# Patient Record
Sex: MALE | Race: WHITE | NOT HISPANIC OR LATINO | Employment: FULL TIME | URBAN - NONMETROPOLITAN AREA
[De-identification: names, ages, dates, MRNs, and addresses within clinical notes are randomized per-mention and may not be internally consistent; named-entity substitution may affect disease eponyms.]

---

## 2023-06-07 PROBLEM — K21.9 GERD (GASTROESOPHAGEAL REFLUX DISEASE): Status: ACTIVE | Noted: 2023-06-07

## 2023-06-07 PROBLEM — L21.9 SEBORRHEIC DERMATITIS OF SCALP: Status: ACTIVE | Noted: 2023-06-07

## 2023-06-07 PROBLEM — F41.9 ANXIETY: Status: ACTIVE | Noted: 2023-06-07

## 2023-06-07 PROBLEM — R91.8 LUNG NODULES: Status: ACTIVE | Noted: 2023-06-07

## 2023-06-07 PROBLEM — N52.9 ERECTILE DYSFUNCTION: Status: ACTIVE | Noted: 2023-06-07

## 2023-06-07 RX ORDER — GABAPENTIN 300 MG/1
2 CAPSULE ORAL 3 TIMES DAILY
COMMUNITY
Start: 2022-08-31 | End: 2023-08-21 | Stop reason: SINTOL

## 2023-06-07 RX ORDER — KETOCONAZOLE 20 MG/ML
SHAMPOO, SUSPENSION TOPICAL EVERY OTHER DAY
COMMUNITY
Start: 2017-11-10 | End: 2023-06-14 | Stop reason: SDUPTHER

## 2023-06-07 RX ORDER — TIZANIDINE 4 MG/1
1 TABLET ORAL 3 TIMES DAILY
COMMUNITY
Start: 2021-12-22

## 2023-06-07 RX ORDER — MELOXICAM 15 MG/1
1 TABLET ORAL DAILY
COMMUNITY
Start: 2021-12-22 | End: 2023-09-13 | Stop reason: ALTCHOICE

## 2023-06-14 ENCOUNTER — OFFICE VISIT (OUTPATIENT)
Dept: PRIMARY CARE | Facility: CLINIC | Age: 63
End: 2023-06-14
Payer: COMMERCIAL

## 2023-06-14 VITALS
SYSTOLIC BLOOD PRESSURE: 120 MMHG | DIASTOLIC BLOOD PRESSURE: 60 MMHG | BODY MASS INDEX: 24.04 KG/M2 | HEART RATE: 97 BPM | HEIGHT: 66 IN | TEMPERATURE: 98.6 F | OXYGEN SATURATION: 90 % | WEIGHT: 149.6 LBS

## 2023-06-14 DIAGNOSIS — F17.219 CIGARETTE NICOTINE DEPENDENCE WITH NICOTINE-INDUCED DISORDER: ICD-10-CM

## 2023-06-14 DIAGNOSIS — L21.9 SEBORRHEIC DERMATITIS OF SCALP: ICD-10-CM

## 2023-06-14 DIAGNOSIS — G47.33 OBSTRUCTIVE SLEEP APNEA: Chronic | ICD-10-CM

## 2023-06-14 DIAGNOSIS — R06.00 DYSPNEA, UNSPECIFIED TYPE: Chronic | ICD-10-CM

## 2023-06-14 DIAGNOSIS — L40.9 PSORIASIS: Chronic | ICD-10-CM

## 2023-06-14 DIAGNOSIS — M54.17 LUMBOSACRAL RADICULITIS: Chronic | ICD-10-CM

## 2023-06-14 DIAGNOSIS — Z00.00 ANNUAL PHYSICAL EXAM: Primary | ICD-10-CM

## 2023-06-14 PROCEDURE — G0296 VISIT TO DETERM LDCT ELIG: HCPCS | Performed by: INTERNAL MEDICINE

## 2023-06-14 PROCEDURE — 93000 ELECTROCARDIOGRAM COMPLETE: CPT | Performed by: INTERNAL MEDICINE

## 2023-06-14 PROCEDURE — 90471 IMMUNIZATION ADMIN: CPT | Performed by: INTERNAL MEDICINE

## 2023-06-14 PROCEDURE — 99396 PREV VISIT EST AGE 40-64: CPT | Performed by: INTERNAL MEDICINE

## 2023-06-14 PROCEDURE — 99406 BEHAV CHNG SMOKING 3-10 MIN: CPT | Performed by: INTERNAL MEDICINE

## 2023-06-14 PROCEDURE — 99214 OFFICE O/P EST MOD 30 MIN: CPT | Performed by: INTERNAL MEDICINE

## 2023-06-14 PROCEDURE — 90750 HZV VACC RECOMBINANT IM: CPT | Performed by: INTERNAL MEDICINE

## 2023-06-14 RX ORDER — KETOCONAZOLE 20 MG/ML
SHAMPOO, SUSPENSION TOPICAL EVERY OTHER DAY
Qty: 230 ML | Refills: 2 | Status: SHIPPED | OUTPATIENT
Start: 2023-06-14 | End: 2023-11-21 | Stop reason: SDUPTHER

## 2023-06-14 ASSESSMENT — ENCOUNTER SYMPTOMS
SINUS PAIN: 0
FEVER: 0
LOSS OF SENSATION IN FEET: 1
ARTHRALGIAS: 0
ABDOMINAL PAIN: 0
PALPITATIONS: 0
DIFFICULTY URINATING: 0
BLOOD IN STOOL: 0
FATIGUE: 1
UNEXPECTED WEIGHT CHANGE: 0
COUGH: 0
HEADACHES: 0
SORE THROAT: 0
OCCASIONAL FEELINGS OF UNSTEADINESS: 1
DIZZINESS: 0
BRUISES/BLEEDS EASILY: 0
DIARRHEA: 0
WHEEZING: 0
DEPRESSION: 0

## 2023-06-14 ASSESSMENT — ANXIETY QUESTIONNAIRES
4. TROUBLE RELAXING: NOT AT ALL
5. BEING SO RESTLESS THAT IT IS HARD TO SIT STILL: NOT AT ALL
2. NOT BEING ABLE TO STOP OR CONTROL WORRYING: NOT AT ALL
6. BECOMING EASILY ANNOYED OR IRRITABLE: NOT AT ALL
3. WORRYING TOO MUCH ABOUT DIFFERENT THINGS: NOT AT ALL
IF YOU CHECKED OFF ANY PROBLEMS ON THIS QUESTIONNAIRE, HOW DIFFICULT HAVE THESE PROBLEMS MADE IT FOR YOU TO DO YOUR WORK, TAKE CARE OF THINGS AT HOME, OR GET ALONG WITH OTHER PEOPLE: NOT DIFFICULT AT ALL
7. FEELING AFRAID AS IF SOMETHING AWFUL MIGHT HAPPEN: NOT AT ALL
GAD7 TOTAL SCORE: 0
1. FEELING NERVOUS, ANXIOUS, OR ON EDGE: NOT AT ALL

## 2023-06-14 ASSESSMENT — PATIENT HEALTH QUESTIONNAIRE - PHQ9
2. FEELING DOWN, DEPRESSED OR HOPELESS: NOT AT ALL
SUM OF ALL RESPONSES TO PHQ9 QUESTIONS 1 AND 2: 0
1. LITTLE INTEREST OR PLEASURE IN DOING THINGS: NOT AT ALL

## 2023-06-14 NOTE — PROGRESS NOTES
"Subjective   Patient ID: Lamberto Martinez is a 63 y.o. male who presents for Annual Exam (Would like a different cream ).    Annual preventive visit  - Vaccinations reviewed needs Shingrix vaccine given first dose today follow-up 2 months for booster dose  - Screening colonoscopy up-to-date 2019 need to repeat in 2024  -Needs a complete blood work  -Depression screening negative  -Nicotine dependency  \"I spent more 10 minutes counseling patient about need for smoking/tobacco cessation and how I can support efforts when patient is ready to quit.  Discussed nicotine replacement therapy, Varenicline, Bupropion, hypnosis, support groups, and accupunture as potential options.  Patient currently has no signs or symptoms of tobacco related disease.\".  -Screening for lung cancer  I discussed smoking history, patient meets criteria for lung cancer screening with low-dose CT scan. By using shared decision making we determined that patient would benefit from that screening including a discussion of benefits and harms of screening, follow-up diagnostic testing, over diagnosis, false positive  rate and total radiation exposure  I counseled the patient about the  importance of adhering to annual lung cancer low-dose CT screening, the impact of comorbidities and his  ability or willingness to undergo diagnosis and treatment if abnormality discovered. I provided patient an order for low-dose CT lung cancer screening.      Follow-up  -History of traumatic nose injury with chronic blockage patient is underlying sleep apnea did not tolerate CPAP complaining of fatigue and tiredness  Refer patient to sleep clinic for further eval recommendation need to repeat sleep studies  - Shortness of breath  EKG sinus rhythm proceed with CT scan as recommended  - Psoriasis continue with current local creams steroid cream betamethasone  -Psoriasis  - Chronic lumbosacral disease postlaminectomy lumbar left radiculitis continues gabapentin  Reevaluate " "patient in 2 months             Review of Systems   Constitutional:  Positive for fatigue. Negative for fever and unexpected weight change.   HENT:  Negative for congestion, ear discharge, ear pain, mouth sores, sinus pain and sore throat.    Eyes:  Negative for visual disturbance.   Respiratory:  Negative for cough and wheezing.    Cardiovascular:  Negative for chest pain, palpitations and leg swelling.   Gastrointestinal:  Negative for abdominal pain, blood in stool and diarrhea.   Genitourinary:  Negative for difficulty urinating.   Musculoskeletal:  Negative for arthralgias.   Skin:  Negative for rash.   Neurological:  Negative for dizziness and headaches.   Hematological:  Does not bruise/bleed easily.   Psychiatric/Behavioral:  Negative for behavioral problems.    All other systems reviewed and are negative.      Objective   No results found for: \"HGBA1C\"   /60   Pulse 97   Temp 37 °C (98.6 °F)   Ht 1.676 m (5' 6\")   Wt 67.9 kg (149 lb 9.6 oz)   SpO2 90%   BMI 24.15 kg/m²   Lab Results   Component Value Date    WBC 7.9 03/02/2022    HGB 15.0 03/02/2022    HCT 42.8 03/02/2022     03/02/2022    CHOL 129 10/03/2018    TRIG 66 10/03/2018    HDL 36.0 (A) 10/03/2018    ALT 14 03/02/2022    AST 15 03/02/2022     03/02/2022    K 3.8 03/02/2022     03/02/2022    CREATININE 0.76 03/02/2022    BUN 22 03/02/2022    CO2 28 03/02/2022    TSH 1.04 01/15/2020    PSA 4.66 (H) 10/03/2018    INR 1.0 03/02/2022     par   Physical Exam  Vitals and nursing note reviewed.   Constitutional:       Appearance: Normal appearance.   HENT:      Head: Normocephalic.      Nose: Nose normal.   Eyes:      Conjunctiva/sclera: Conjunctivae normal.      Pupils: Pupils are equal, round, and reactive to light.   Cardiovascular:      Rate and Rhythm: Regular rhythm.   Pulmonary:      Effort: Pulmonary effort is normal.      Breath sounds: Normal breath sounds.   Abdominal:      General: Abdomen is flat.      " "Palpations: Abdomen is soft.   Musculoskeletal:      Cervical back: Neck supple.   Skin:     General: Skin is warm.   Neurological:      General: No focal deficit present.      Mental Status: He is oriented to person, place, and time.   Psychiatric:         Mood and Affect: Mood normal.         Assessment/Plan   Lamberto was seen today for annual exam.  Diagnoses and all orders for this visit:  Annual physical exam (Primary)  -     CBC and Auto Differential; Future  -     Comprehensive Metabolic Panel; Future  -     Lipid Panel; Future  -     Magnesium; Future  -     Prostate Specific Antigen; Future  -     TSH with reflex to Free T4 if abnormal; Future  -     ECG 12 lead  -     Zoster vaccine, recombinant, adult (SHINGRIX)  Seborrheic dermatitis of scalp  -     ketoconazole (NIZOral) 2 % shampoo; Apply topically every other day. Apply to scalp every other day for 5 minutes and rinse  Cigarette nicotine dependence with nicotine-induced disorder  -     CT lung screening low dose; Future  Dyspnea, unspecified type  Obstructive sleep apnea  -     Referral to Adult Sleep Medicine; Future  Psoriasis  Lumbosacral radiculitis  Other orders  -     Follow Up In Primary Care; Future   Annual preventive visit  - Vaccinations reviewed needs Shingrix vaccine given first dose today follow-up 2 months for booster dose  - Screening colonoscopy up-to-date 2019 need to repeat in 2024  -Needs a complete blood work  -Depression screening negative  -Nicotine dependency  \"I spent more 10 minutes counseling patient about need for smoking/tobacco cessation and how I can support efforts when patient is ready to quit.  Discussed nicotine replacement therapy, Varenicline, Bupropion, hypnosis, support groups, and accupunture as potential options.  Patient currently has no signs or symptoms of tobacco related disease.\".  -Screening for lung cancer  I discussed smoking history, patient meets criteria for lung cancer screening with low-dose CT " scan. By using shared decision making we determined that patient would benefit from that screening including a discussion of benefits and harms of screening, follow-up diagnostic testing, over diagnosis, false positive  rate and total radiation exposure  I counseled the patient about the  importance of adhering to annual lung cancer low-dose CT screening, the impact of comorbidities and his  ability or willingness to undergo diagnosis and treatment if abnormality discovered. I provided patient an order for low-dose CT lung cancer screening.      Follow-up  -History of traumatic nose injury with chronic blockage patient is underlying sleep apnea did not tolerate CPAP complaining of fatigue and tiredness  Refer patient to sleep clinic for further eval recommendation need to repeat sleep studies  - Shortness of breath  EKG sinus rhythm proceed with CT scan as recommended  - Psoriasis continue with current local creams steroid cream betamethasone  -Psoriasis  - Chronic lumbosacral disease postlaminectomy lumbar left radiculitis continues gabapentin  Reevaluate patient in 2 months

## 2023-07-08 ENCOUNTER — LAB (OUTPATIENT)
Dept: LAB | Facility: LAB | Age: 63
End: 2023-07-08
Payer: COMMERCIAL

## 2023-07-08 DIAGNOSIS — Z00.00 ANNUAL PHYSICAL EXAM: ICD-10-CM

## 2023-07-08 LAB
ALANINE AMINOTRANSFERASE (SGPT) (U/L) IN SER/PLAS: 11 U/L (ref 10–52)
ALBUMIN (G/DL) IN SER/PLAS: 4.1 G/DL (ref 3.4–5)
ALKALINE PHOSPHATASE (U/L) IN SER/PLAS: 73 U/L (ref 33–136)
ANION GAP IN SER/PLAS: 11 MMOL/L (ref 10–20)
ASPARTATE AMINOTRANSFERASE (SGOT) (U/L) IN SER/PLAS: 15 U/L (ref 9–39)
BASOPHILS (10*3/UL) IN BLOOD BY AUTOMATED COUNT: 0.09 X10E9/L (ref 0–0.1)
BASOPHILS/100 LEUKOCYTES IN BLOOD BY AUTOMATED COUNT: 1 % (ref 0–2)
BILIRUBIN TOTAL (MG/DL) IN SER/PLAS: 0.4 MG/DL (ref 0–1.2)
CALCIUM (MG/DL) IN SER/PLAS: 9.1 MG/DL (ref 8.6–10.3)
CARBON DIOXIDE, TOTAL (MMOL/L) IN SER/PLAS: 27 MMOL/L (ref 21–32)
CHLORIDE (MMOL/L) IN SER/PLAS: 107 MMOL/L (ref 98–107)
CHOLESTEROL (MG/DL) IN SER/PLAS: 139 MG/DL (ref 0–199)
CHOLESTEROL IN HDL (MG/DL) IN SER/PLAS: 34 MG/DL
CHOLESTEROL/HDL RATIO: 4.1
CREATININE (MG/DL) IN SER/PLAS: 0.75 MG/DL (ref 0.5–1.3)
EOSINOPHILS (10*3/UL) IN BLOOD BY AUTOMATED COUNT: 0.28 X10E9/L (ref 0–0.7)
EOSINOPHILS/100 LEUKOCYTES IN BLOOD BY AUTOMATED COUNT: 3.2 % (ref 0–6)
ERYTHROCYTE DISTRIBUTION WIDTH (RATIO) BY AUTOMATED COUNT: 12.5 % (ref 11.5–14.5)
ERYTHROCYTE MEAN CORPUSCULAR HEMOGLOBIN CONCENTRATION (G/DL) BY AUTOMATED: 32.5 G/DL (ref 32–36)
ERYTHROCYTE MEAN CORPUSCULAR VOLUME (FL) BY AUTOMATED COUNT: 96 FL (ref 80–100)
ERYTHROCYTES (10*6/UL) IN BLOOD BY AUTOMATED COUNT: 4.83 X10E12/L (ref 4.5–5.9)
GFR MALE: >90 ML/MIN/1.73M2
GLUCOSE (MG/DL) IN SER/PLAS: 87 MG/DL (ref 74–99)
HEMATOCRIT (%) IN BLOOD BY AUTOMATED COUNT: 46.5 % (ref 41–52)
HEMOGLOBIN (G/DL) IN BLOOD: 15.1 G/DL (ref 13.5–17.5)
IMMATURE GRANULOCYTES/100 LEUKOCYTES IN BLOOD BY AUTOMATED COUNT: 0.2 % (ref 0–0.9)
LDL: 85 MG/DL (ref 0–99)
LEUKOCYTES (10*3/UL) IN BLOOD BY AUTOMATED COUNT: 8.9 X10E9/L (ref 4.4–11.3)
LYMPHOCYTES (10*3/UL) IN BLOOD BY AUTOMATED COUNT: 2.15 X10E9/L (ref 1.2–4.8)
LYMPHOCYTES/100 LEUKOCYTES IN BLOOD BY AUTOMATED COUNT: 24.2 % (ref 13–44)
MAGNESIUM (MG/DL) IN SER/PLAS: 2.1 MG/DL (ref 1.6–2.4)
MONOCYTES (10*3/UL) IN BLOOD BY AUTOMATED COUNT: 0.86 X10E9/L (ref 0.1–1)
MONOCYTES/100 LEUKOCYTES IN BLOOD BY AUTOMATED COUNT: 9.7 % (ref 2–10)
NEUTROPHILS (10*3/UL) IN BLOOD BY AUTOMATED COUNT: 5.48 X10E9/L (ref 1.2–7.7)
NEUTROPHILS/100 LEUKOCYTES IN BLOOD BY AUTOMATED COUNT: 61.7 % (ref 40–80)
PLATELETS (10*3/UL) IN BLOOD AUTOMATED COUNT: 319 X10E9/L (ref 150–450)
POTASSIUM (MMOL/L) IN SER/PLAS: 4.4 MMOL/L (ref 3.5–5.3)
PROSTATE SPECIFIC AG (NG/ML) IN SER/PLAS: 7.71 NG/ML (ref 0–4)
PROTEIN TOTAL: 6.6 G/DL (ref 6.4–8.2)
SODIUM (MMOL/L) IN SER/PLAS: 141 MMOL/L (ref 136–145)
THYROTROPIN (MIU/L) IN SER/PLAS BY DETECTION LIMIT <= 0.05 MIU/L: 1.7 MIU/L (ref 0.44–3.98)
TRIGLYCERIDE (MG/DL) IN SER/PLAS: 99 MG/DL (ref 0–149)
UREA NITROGEN (MG/DL) IN SER/PLAS: 18 MG/DL (ref 6–23)
VLDL: 20 MG/DL (ref 0–40)

## 2023-07-08 PROCEDURE — 83735 ASSAY OF MAGNESIUM: CPT

## 2023-07-08 PROCEDURE — 80061 LIPID PANEL: CPT

## 2023-07-08 PROCEDURE — 80053 COMPREHEN METABOLIC PANEL: CPT

## 2023-07-08 PROCEDURE — 36415 COLL VENOUS BLD VENIPUNCTURE: CPT

## 2023-07-08 PROCEDURE — 84153 ASSAY OF PSA TOTAL: CPT

## 2023-07-08 PROCEDURE — 84443 ASSAY THYROID STIM HORMONE: CPT

## 2023-07-08 PROCEDURE — 85025 COMPLETE CBC W/AUTO DIFF WBC: CPT

## 2023-07-13 ENCOUNTER — APPOINTMENT (OUTPATIENT)
Dept: PRIMARY CARE | Facility: CLINIC | Age: 63
End: 2023-07-13
Payer: COMMERCIAL

## 2023-08-21 ENCOUNTER — OFFICE VISIT (OUTPATIENT)
Dept: PRIMARY CARE | Facility: CLINIC | Age: 63
End: 2023-08-21
Payer: COMMERCIAL

## 2023-08-21 VITALS
BODY MASS INDEX: 24.14 KG/M2 | HEART RATE: 87 BPM | WEIGHT: 150.2 LBS | SYSTOLIC BLOOD PRESSURE: 110 MMHG | DIASTOLIC BLOOD PRESSURE: 64 MMHG | HEIGHT: 66 IN | OXYGEN SATURATION: 93 % | TEMPERATURE: 98 F

## 2023-08-21 DIAGNOSIS — R06.00 DYSPNEA, UNSPECIFIED TYPE: ICD-10-CM

## 2023-08-21 DIAGNOSIS — F17.219 CIGARETTE NICOTINE DEPENDENCE WITH NICOTINE-INDUCED DISORDER: Primary | ICD-10-CM

## 2023-08-21 DIAGNOSIS — M54.17 LUMBOSACRAL RADICULITIS: ICD-10-CM

## 2023-08-21 DIAGNOSIS — E78.00 HYPERCHOLESTEREMIA: ICD-10-CM

## 2023-08-21 DIAGNOSIS — I25.83 CORONARY ARTERY DISEASE DUE TO LIPID RICH PLAQUE: ICD-10-CM

## 2023-08-21 DIAGNOSIS — Z23 NEED FOR SHINGLES VACCINE: ICD-10-CM

## 2023-08-21 DIAGNOSIS — I25.10 CORONARY ARTERY DISEASE DUE TO LIPID RICH PLAQUE: ICD-10-CM

## 2023-08-21 DIAGNOSIS — G47.33 OBSTRUCTIVE SLEEP APNEA: ICD-10-CM

## 2023-08-21 PROCEDURE — 90471 IMMUNIZATION ADMIN: CPT | Performed by: INTERNAL MEDICINE

## 2023-08-21 PROCEDURE — 90750 HZV VACC RECOMBINANT IM: CPT | Performed by: INTERNAL MEDICINE

## 2023-08-21 PROCEDURE — 99214 OFFICE O/P EST MOD 30 MIN: CPT | Performed by: INTERNAL MEDICINE

## 2023-08-21 RX ORDER — BUPROPION HYDROCHLORIDE 150 MG/1
150 TABLET, EXTENDED RELEASE ORAL 2 TIMES DAILY
Qty: 60 TABLET | Refills: 1 | Status: SHIPPED | OUTPATIENT
Start: 2023-08-21 | End: 2023-12-21 | Stop reason: SDUPTHER

## 2023-08-21 RX ORDER — ROSUVASTATIN CALCIUM 10 MG/1
10 TABLET, COATED ORAL DAILY
Qty: 30 TABLET | Refills: 5 | Status: SHIPPED | OUTPATIENT
Start: 2023-08-21 | End: 2023-11-21 | Stop reason: SDUPTHER

## 2023-08-21 RX ORDER — MELOXICAM 15 MG/1
15 TABLET ORAL DAILY
Qty: 90 TABLET | Refills: 0 | Status: CANCELLED | OUTPATIENT
Start: 2023-08-21

## 2023-08-21 ASSESSMENT — ENCOUNTER SYMPTOMS
PALPITATIONS: 0
UNEXPECTED WEIGHT CHANGE: 0
WHEEZING: 0
ABDOMINAL PAIN: 0
FEVER: 0
SINUS PAIN: 0
SORE THROAT: 0
DIARRHEA: 0
COUGH: 0
FATIGUE: 0
BRUISES/BLEEDS EASILY: 0
DIFFICULTY URINATING: 0
ARTHRALGIAS: 0
DIZZINESS: 0
HEADACHES: 0
BLOOD IN STOOL: 0

## 2023-08-21 NOTE — PROGRESS NOTES
"Subjective   Patient ID: Lamberto Martinez is a 63 y.o. male who presents for Follow-up (2 month shingrix shot).    -Nicotine dependency  \"I spent more 10 minutes counseling patient about need for smoking/tobacco cessation and how I can support efforts when patient is ready to quit.  Discussed nicotine replacement therapy, Varenicline, Bupropion, hypnosis, support groups, and accupunture as potential options.  Patient currently has no signs or symptoms of tobacco related disease.\".  -Patient to start on Zyban 150 mg twice a day counseled about use and side effects  - Low-dose CT scanning stable repeat in August 2024  - Lung emphysema counseled for smoking cessation  - Underlying extensive coronary artery calcification  Starting Crestor 10 mg daily refer patient to cardiology for further work-up  - Underlying sleep apnea need to proceed with sleep testing as recommended follow-up sleep clinic  -History of lumbosacral surgery and chronic right lower extremity sciatica compensated on Lyrica and tizanidine follow-up pain management as recommended  -Booster dose for Shingrix given today  - Psoriasis continue with current local creams steroid cream betamethasone  Follow-up 3 months             Review of Systems   Constitutional:  Negative for fatigue, fever and unexpected weight change.   HENT:  Negative for congestion, ear discharge, ear pain, mouth sores, sinus pain and sore throat.    Eyes:  Negative for visual disturbance.   Respiratory:  Negative for cough and wheezing.    Cardiovascular:  Negative for chest pain, palpitations and leg swelling.   Gastrointestinal:  Negative for abdominal pain, blood in stool and diarrhea.   Genitourinary:  Negative for difficulty urinating.   Musculoskeletal:  Negative for arthralgias.   Skin:  Negative for rash.   Neurological:  Negative for dizziness and headaches.   Hematological:  Does not bruise/bleed easily.   Psychiatric/Behavioral:  Negative for behavioral problems.    All " "other systems reviewed and are negative.      Objective   No results found for: \"HGBA1C\"   /64   Pulse 87   Temp 36.7 °C (98 °F)   Ht 1.676 m (5' 6\")   Wt 68.1 kg (150 lb 3.2 oz)   SpO2 93%   BMI 24.24 kg/m²   Lab Results   Component Value Date    WBC 8.9 07/08/2023    HGB 15.1 07/08/2023    HCT 46.5 07/08/2023     07/08/2023    CHOL 139 07/08/2023    TRIG 99 07/08/2023    HDL 34.0 (A) 07/08/2023    ALT 11 07/08/2023    AST 15 07/08/2023     07/08/2023    K 4.4 07/08/2023     07/08/2023    CREATININE 0.75 07/08/2023    BUN 18 07/08/2023    CO2 27 07/08/2023    TSH 1.70 07/08/2023    PSA 7.71 (H) 07/08/2023    INR 1.0 03/02/2022     par   Physical Exam  Vitals and nursing note reviewed.   Constitutional:       Appearance: Normal appearance.   HENT:      Head: Normocephalic.      Nose: Nose normal.   Eyes:      Conjunctiva/sclera: Conjunctivae normal.      Pupils: Pupils are equal, round, and reactive to light.   Cardiovascular:      Rate and Rhythm: Regular rhythm.   Pulmonary:      Effort: Pulmonary effort is normal.      Breath sounds: Normal breath sounds.   Abdominal:      General: Abdomen is flat.      Palpations: Abdomen is soft.   Musculoskeletal:      Cervical back: Neck supple.   Skin:     General: Skin is warm.   Neurological:      General: No focal deficit present.      Mental Status: He is oriented to person, place, and time.   Psychiatric:         Mood and Affect: Mood normal.         Assessment/Plan   Lamberto was seen today for follow-up.  Diagnoses and all orders for this visit:  Cigarette nicotine dependence with nicotine-induced disorder (Primary)  -     buPROPion SR (Wellbutrin SR) 150 mg 12 hr tablet; Take 1 tablet (150 mg) by mouth 2 times a day. Do not crush, chew, or split.  Need for shingles vaccine  -     Zoster vaccine, recombinant, adult (SHINGRIX)  Dyspnea, unspecified type  Obstructive sleep apnea  -     Referral to Adult Sleep Medicine; " "Future  Hypercholesteremia  -     rosuvastatin (Crestor) 10 mg tablet; Take 1 tablet (10 mg) by mouth once daily.  Coronary artery disease due to lipid rich plaque  -     Referral to Cardiology; Future  Lumbosacral radiculitis  Other orders  -     Follow Up In Primary Care   -Nicotine dependency  \"I spent more 10 minutes counseling patient about need for smoking/tobacco cessation and how I can support efforts when patient is ready to quit.  Discussed nicotine replacement therapy, Varenicline, Bupropion, hypnosis, support groups, and accupunture as potential options.  Patient currently has no signs or symptoms of tobacco related disease.\".  -Patient to start on Zyban 150 mg twice a day counseled about use and side effects  - Low-dose CT scanning stable repeat in August 2024  - Lung emphysema counseled for smoking cessation  - Underlying extensive coronary artery calcification  Starting Crestor 10 mg daily refer patient to cardiology for further work-up  - Underlying sleep apnea need to proceed with sleep testing as recommended follow-up sleep clinic  -History of lumbosacral surgery and chronic right lower extremity sciatica compensated on Lyrica and tizanidine follow-up pain management as recommended  -Booster dose for Shingrix given today  - Psoriasis continue with current local creams steroid cream betamethasone  Follow-up 3 months    "

## 2023-09-13 ENCOUNTER — OFFICE VISIT (OUTPATIENT)
Dept: PRIMARY CARE | Facility: CLINIC | Age: 63
End: 2023-09-13
Payer: COMMERCIAL

## 2023-09-13 VITALS
WEIGHT: 150.3 LBS | OXYGEN SATURATION: 95 % | DIASTOLIC BLOOD PRESSURE: 68 MMHG | SYSTOLIC BLOOD PRESSURE: 120 MMHG | BODY MASS INDEX: 24.26 KG/M2 | HEART RATE: 111 BPM | TEMPERATURE: 97.7 F

## 2023-09-13 DIAGNOSIS — M75.22 BICEPS TENDINITIS OF LEFT UPPER EXTREMITY: Primary | ICD-10-CM

## 2023-09-13 DIAGNOSIS — M79.602 LEFT ARM PAIN: ICD-10-CM

## 2023-09-13 PROCEDURE — 93000 ELECTROCARDIOGRAM COMPLETE: CPT | Performed by: NURSE PRACTITIONER

## 2023-09-13 PROCEDURE — 99213 OFFICE O/P EST LOW 20 MIN: CPT | Performed by: NURSE PRACTITIONER

## 2023-09-13 RX ORDER — GABAPENTIN 300 MG/1
300 CAPSULE ORAL 3 TIMES DAILY
COMMUNITY
End: 2023-09-13 | Stop reason: ALTCHOICE

## 2023-09-13 RX ORDER — PREGABALIN 100 MG/1
100 CAPSULE ORAL 2 TIMES DAILY
COMMUNITY

## 2023-09-13 RX ORDER — PREDNISONE 10 MG/1
TABLET ORAL
Qty: 30 TABLET | Refills: 0 | Status: SHIPPED | OUTPATIENT
Start: 2023-09-13 | End: 2023-11-21 | Stop reason: ALTCHOICE

## 2023-09-13 NOTE — PROGRESS NOTES
Subjective   Patient ID: Lamberto Martinez is a 63 y.o. male who presents for Arm Pain (Left, started in neck went through back to shoulder around arm, through elbow down to wrist).    C/o chronic left neck and shoulder pain.  Pain worsening over the last week.  Pain radiates from neck and shoulder into biceps tendon.  At times radiates to elbow and wrist.  EKG sinus rhythm.  Tenderness left biceps tendon, pain in biceps tendon with active range of motion.  Left biceps tendinitis, start prednisone taper.  Instructed to call the office if symptoms worsen or do not improve.  Tylenol 650-1000 mg every 8 hours as needed for pain.         Review of Systems   Constitutional:  Negative for activity change, chills, fatigue and unexpected weight change.   HENT:  Negative for congestion, ear pain and sore throat.    Eyes: Negative.    Respiratory:  Negative for cough, shortness of breath and wheezing.    Cardiovascular:  Negative for chest pain, palpitations and leg swelling.   Gastrointestinal: Negative.  Negative for abdominal pain, constipation, diarrhea, nausea and vomiting.   Endocrine: Negative.    Genitourinary: Negative.    Musculoskeletal:  Positive for arthralgias.   Skin: Negative.    Allergic/Immunologic: Negative.    Neurological:  Negative for dizziness, speech difficulty, numbness and headaches.   Hematological: Negative.    Psychiatric/Behavioral: Negative.     All other systems reviewed and are negative.      Objective   /68   Pulse (!) 111   Temp 36.5 °C (97.7 °F)   Wt 68.2 kg (150 lb 4.8 oz)   SpO2 95%   BMI 24.26 kg/m²     Physical Exam  Vitals and nursing note reviewed.   Constitutional:       General: He is not in acute distress.     Appearance: Normal appearance. He is normal weight. He is not ill-appearing.   HENT:      Head: Normocephalic and atraumatic.      Right Ear: Tympanic membrane, ear canal and external ear normal.      Left Ear: Tympanic membrane, ear canal and external ear normal.       Nose: Nose normal.      Mouth/Throat:      Mouth: Mucous membranes are moist.      Pharynx: Oropharynx is clear.   Eyes:      Extraocular Movements: Extraocular movements intact.      Conjunctiva/sclera: Conjunctivae normal.      Pupils: Pupils are equal, round, and reactive to light.   Cardiovascular:      Rate and Rhythm: Normal rate and regular rhythm.      Pulses: Normal pulses.      Heart sounds: Normal heart sounds. No murmur heard.  Pulmonary:      Effort: Pulmonary effort is normal. No respiratory distress.      Breath sounds: Normal breath sounds. No wheezing.   Abdominal:      General: Bowel sounds are normal. There is no distension.      Palpations: Abdomen is soft.      Tenderness: There is no abdominal tenderness.   Musculoskeletal:         General: Normal range of motion.      Left upper arm: Tenderness present.      Cervical back: Normal range of motion and neck supple.      Right lower leg: No edema.      Left lower leg: No edema.   Skin:     General: Skin is warm and dry.      Capillary Refill: Capillary refill takes less than 2 seconds.   Neurological:      General: No focal deficit present.      Mental Status: He is alert and oriented to person, place, and time.   Psychiatric:         Mood and Affect: Mood normal.         Behavior: Behavior normal.         Thought Content: Thought content normal.         Judgment: Judgment normal.         Assessment/Plan     #Left biceps tendinitis  -Start prednisone taper  -Tylenol 650-1000 mg every 8 hours as needed for pain  -Call the office if symptoms worsen or do not improve    Follow-up as scheduled with Dr. Humphries and as needed

## 2023-09-17 ASSESSMENT — ENCOUNTER SYMPTOMS
DIZZINESS: 0
PALPITATIONS: 0
ACTIVITY CHANGE: 0
GASTROINTESTINAL NEGATIVE: 1
DIARRHEA: 0
CONSTIPATION: 0
ABDOMINAL PAIN: 0
SHORTNESS OF BREATH: 0
NUMBNESS: 0
HEADACHES: 0
PSYCHIATRIC NEGATIVE: 1
HEMATOLOGIC/LYMPHATIC NEGATIVE: 1
UNEXPECTED WEIGHT CHANGE: 0
COUGH: 0
SPEECH DIFFICULTY: 0
ENDOCRINE NEGATIVE: 1
SORE THROAT: 0
VOMITING: 0
WHEEZING: 0
ALLERGIC/IMMUNOLOGIC NEGATIVE: 1
NAUSEA: 0
FATIGUE: 0
ARTHRALGIAS: 1
CHILLS: 0
EYES NEGATIVE: 1

## 2023-11-06 ENCOUNTER — APPOINTMENT (OUTPATIENT)
Dept: RADIOLOGY | Facility: HOSPITAL | Age: 63
End: 2023-11-06
Payer: COMMERCIAL

## 2023-11-06 ENCOUNTER — HOSPITAL ENCOUNTER (EMERGENCY)
Facility: HOSPITAL | Age: 63
Discharge: HOME | End: 2023-11-06
Payer: COMMERCIAL

## 2023-11-06 ENCOUNTER — HOSPITAL ENCOUNTER (EMERGENCY)
Facility: HOSPITAL | Age: 63
Discharge: HOME | End: 2023-11-06
Attending: EMERGENCY MEDICINE
Payer: COMMERCIAL

## 2023-11-06 VITALS
BODY MASS INDEX: 23.54 KG/M2 | HEIGHT: 67 IN | OXYGEN SATURATION: 96 % | WEIGHT: 150 LBS | DIASTOLIC BLOOD PRESSURE: 86 MMHG | HEART RATE: 84 BPM | TEMPERATURE: 97 F | SYSTOLIC BLOOD PRESSURE: 148 MMHG | RESPIRATION RATE: 16 BRPM

## 2023-11-06 DIAGNOSIS — L03.116 CELLULITIS OF LEFT LOWER EXTREMITY: Primary | ICD-10-CM

## 2023-11-06 PROCEDURE — 93971 EXTREMITY STUDY: CPT | Performed by: RADIOLOGY

## 2023-11-06 PROCEDURE — 2500000001 HC RX 250 WO HCPCS SELF ADMINISTERED DRUGS (ALT 637 FOR MEDICARE OP): Performed by: EMERGENCY MEDICINE

## 2023-11-06 PROCEDURE — 2500000004 HC RX 250 GENERAL PHARMACY W/ HCPCS (ALT 636 FOR OP/ED): Performed by: EMERGENCY MEDICINE

## 2023-11-06 PROCEDURE — 99284 EMERGENCY DEPT VISIT MOD MDM: CPT | Mod: 25

## 2023-11-06 PROCEDURE — 93971 EXTREMITY STUDY: CPT

## 2023-11-06 PROCEDURE — 96372 THER/PROPH/DIAG INJ SC/IM: CPT

## 2023-11-06 PROCEDURE — 4500999001 HC ED NO CHARGE

## 2023-11-06 PROCEDURE — 99285 EMERGENCY DEPT VISIT HI MDM: CPT | Mod: 25 | Performed by: EMERGENCY MEDICINE

## 2023-11-06 RX ORDER — CEPHALEXIN 250 MG/1
500 CAPSULE ORAL EVERY 12 HOURS SCHEDULED
Status: DISCONTINUED | OUTPATIENT
Start: 2023-11-06 | End: 2023-11-07 | Stop reason: HOSPADM

## 2023-11-06 RX ORDER — CEPHALEXIN 500 MG/1
500 CAPSULE ORAL 2 TIMES DAILY
Qty: 20 CAPSULE | Refills: 0 | Status: SHIPPED | OUTPATIENT
Start: 2023-11-06 | End: 2023-11-21 | Stop reason: ALTCHOICE

## 2023-11-06 RX ORDER — CEPHALEXIN 500 MG/1
500 CAPSULE ORAL 4 TIMES DAILY
Qty: 40 CAPSULE | Refills: 0 | Status: SHIPPED | OUTPATIENT
Start: 2023-11-06 | End: 2023-11-21 | Stop reason: ALTCHOICE

## 2023-11-06 RX ORDER — CEPHALEXIN 250 MG/1
500 CAPSULE ORAL ONCE
Status: DISCONTINUED | OUTPATIENT
Start: 2023-11-06 | End: 2023-11-06

## 2023-11-06 RX ORDER — KETOROLAC TROMETHAMINE 30 MG/ML
30 INJECTION, SOLUTION INTRAMUSCULAR; INTRAVENOUS ONCE
Status: COMPLETED | OUTPATIENT
Start: 2023-11-06 | End: 2023-11-06

## 2023-11-06 RX ORDER — KETOROLAC TROMETHAMINE 10 MG/1
10 TABLET, FILM COATED ORAL EVERY 6 HOURS PRN
Qty: 20 TABLET | Refills: 0 | Status: SHIPPED | OUTPATIENT
Start: 2023-11-06 | End: 2023-11-11

## 2023-11-06 RX ADMIN — CEPHALEXIN 500 MG: 250 CAPSULE ORAL at 23:51

## 2023-11-06 RX ADMIN — KETOROLAC TROMETHAMINE 30 MG: 60 INJECTION, SOLUTION INTRAMUSCULAR at 23:51

## 2023-11-06 ASSESSMENT — PAIN SCALES - GENERAL: PAINLEVEL_OUTOF10: 8

## 2023-11-06 ASSESSMENT — PAIN - FUNCTIONAL ASSESSMENT: PAIN_FUNCTIONAL_ASSESSMENT: 0-10

## 2023-11-07 NOTE — ED PROVIDER NOTES
HPI   Chief Complaint   Patient presents with    Leg Swelling     Left lower leg; painful       Patient has left calf pain for the last couple days.  He says that he has been having muscle cramps in both legs since he had back surgery months ago but 2 days ago this left calf started hurting and it is continued to hurt and seems to be more swollen than it usually is.  Has had no fever chills chest pain or shortness of breath.  No redness to the extremity and there is no actual warmth on my exam or redness but there is tenderness and firmness so he will need an ultrasound which we have ordered.                          No data recorded                Patient History   Past Medical History:   Diagnosis Date    Encounter for other preprocedural examination 03/02/2022    Preoperative clearance    Pain in unspecified shoulder 03/19/2015    Shoulder pain    Personal history of diseases of the skin and subcutaneous tissue 11/25/2013    History of psoriasis    Primary osteoarthritis, unspecified ankle and foot 03/06/2014    Arthritis of foot, degenerative     Past Surgical History:   Procedure Laterality Date    APPENDECTOMY  09/10/2013    Appendectomy    HERNIA REPAIR  09/10/2013    Inguinal Hernia Repair     No family history on file.  Social History     Tobacco Use    Smoking status: Every Day     Types: Cigarettes    Smokeless tobacco: Never   Substance Use Topics    Alcohol use: Never    Drug use: Never       Physical Exam   ED Triage Vitals [11/06/23 2026]   Temp Heart Rate Resp BP   36.1 °C (97 °F) 82 17 123/81      SpO2 Temp src Heart Rate Source Patient Position   96 % -- -- --      BP Location FiO2 (%)     -- --       Physical Exam    ED Course & MDM   Diagnoses as of 11/06/23 2325   Cellulitis of left lower extremity       Medical Decision Making  Patient's ultrasound was negative for DVT.  I think most of the pain is probably the same radiculopathy issue that he takes Lyrica for but it is he says more swollen and  painful than usual so even though there is no erythema or warmth I have told him that it might be worth treating this as a cellulitis along with elevation and heat and pain control.  In that way he can follow-up with his doctor in the next 3 to 4 days if is not improving but at least we know there is no DVT tonight.        Procedure  Procedures     Ladarius Rubio MD  11/06/23 7373       Ladarius Rubio MD  11/06/23 2557

## 2023-11-09 VITALS
RESPIRATION RATE: 14 BRPM | TEMPERATURE: 98 F | HEIGHT: 67 IN | OXYGEN SATURATION: 97 % | WEIGHT: 150 LBS | HEART RATE: 77 BPM | DIASTOLIC BLOOD PRESSURE: 83 MMHG | SYSTOLIC BLOOD PRESSURE: 136 MMHG | BODY MASS INDEX: 23.54 KG/M2

## 2023-11-09 PROCEDURE — 99281 EMR DPT VST MAYX REQ PHY/QHP: CPT | Performed by: FAMILY MEDICINE

## 2023-11-09 ASSESSMENT — PAIN - FUNCTIONAL ASSESSMENT: PAIN_FUNCTIONAL_ASSESSMENT: 0-10

## 2023-11-09 ASSESSMENT — PAIN DESCRIPTION - LOCATION: LOCATION: LEG

## 2023-11-09 ASSESSMENT — PAIN DESCRIPTION - ORIENTATION: ORIENTATION: RIGHT;LEFT

## 2023-11-09 ASSESSMENT — PAIN DESCRIPTION - FREQUENCY: FREQUENCY: CONSTANT/CONTINUOUS

## 2023-11-09 ASSESSMENT — PAIN SCALES - GENERAL: PAINLEVEL_OUTOF10: 5 - MODERATE PAIN

## 2023-11-09 ASSESSMENT — PAIN DESCRIPTION - DESCRIPTORS: DESCRIPTORS: ACHING

## 2023-11-10 ENCOUNTER — HOSPITAL ENCOUNTER (EMERGENCY)
Facility: HOSPITAL | Age: 63
Discharge: OTHER INSTITUTION | End: 2023-11-10
Attending: FAMILY MEDICINE
Payer: COMMERCIAL

## 2023-11-10 DIAGNOSIS — M79.605 PAIN OF LEFT LOWER EXTREMITY: Primary | ICD-10-CM

## 2023-11-10 DIAGNOSIS — Z53.21 ELOPED FROM EMERGENCY DEPARTMENT: ICD-10-CM

## 2023-11-10 NOTE — ED PROVIDER NOTES
HPI   Chief Complaint   Patient presents with    Leg Pain     Lower leg pain and swelling left and right       63-year-old male comes to the ED with complaint of recurrent left lower leg pain that has been dealing with for the last week or 2.  Patient reports he was seen 3 days ago for the same complaint and worked up and discharged.  Patient reports he been taking the medications as prescribed but feels that the swelling and pain is not completely resolved and came back in for second opinion.  He reports his pain continues to persist in the lower leg along the calf muscle and aggravated with prolonged standing and movements.  Patient reports no limitations or reduction in daily activities secondary to the discomfort in his lower leg.  Patient in the ED is alert, cooperative, appears anxious, comfortable, and in no distress.  Patient currently denies headache, neck pain, chest pain, back pain, abdominal pain, shortness of breath, fevers, falls, recent travel, sick contacts, loss sensation, numbness/tingling, ataxia, dizziness, and weakness.      History provided by:  Patient   used: No                        No data recorded                Patient History   Past Medical History:   Diagnosis Date    Encounter for other preprocedural examination 03/02/2022    Preoperative clearance    Pain in unspecified shoulder 03/19/2015    Shoulder pain    Personal history of diseases of the skin and subcutaneous tissue 11/25/2013    History of psoriasis    Primary osteoarthritis, unspecified ankle and foot 03/06/2014    Arthritis of foot, degenerative     Past Surgical History:   Procedure Laterality Date    APPENDECTOMY  09/10/2013    Appendectomy    HERNIA REPAIR  09/10/2013    Inguinal Hernia Repair     No family history on file.  Social History     Tobacco Use    Smoking status: Every Day     Types: Cigarettes    Smokeless tobacco: Never   Substance Use Topics    Alcohol use: Never    Drug use: Never        Physical Exam   ED Triage Vitals [11/09/23 2339]   Temp Heart Rate Resp BP   36.7 °C (98 °F) 77 14 136/83      SpO2 Temp Source Heart Rate Source Patient Position   97 % Tympanic -- Sitting      BP Location FiO2 (%)     Left arm --       Physical Exam  Vitals and nursing note reviewed.   Constitutional:       General: He is not in acute distress.     Appearance: He is well-developed.   HENT:      Head: Normocephalic and atraumatic.   Eyes:      Conjunctiva/sclera: Conjunctivae normal.   Cardiovascular:      Rate and Rhythm: Normal rate and regular rhythm.      Heart sounds: No murmur heard.  Pulmonary:      Effort: Pulmonary effort is normal. No respiratory distress.      Breath sounds: Normal breath sounds.   Abdominal:      Palpations: Abdomen is soft.      Tenderness: There is no abdominal tenderness.   Musculoskeletal:         General: No swelling.      Cervical back: Neck supple.      Left knee: Normal.      Left lower leg: Swelling and tenderness present. No deformity, lacerations or bony tenderness.      Left ankle: Swelling and ecchymosis present. No deformity or lacerations. Tenderness present over the lateral malleolus. Normal range of motion.      Left Achilles Tendon: Normal.      Left foot: Normal.        Legs:    Skin:     General: Skin is warm and dry.      Capillary Refill: Capillary refill takes less than 2 seconds.   Neurological:      General: No focal deficit present.      Mental Status: He is alert and oriented to person, place, and time.      GCS: GCS eye subscore is 4. GCS verbal subscore is 5. GCS motor subscore is 6.      Sensory: Sensation is intact.      Motor: Motor function is intact.      Coordination: Coordination is intact.      Gait: Gait is intact.   Psychiatric:         Mood and Affect: Mood normal.         ED Course & MDM   Diagnoses as of 11/10/23 0122   Pain of left lower extremity   Eloped from emergency department       Medical Decision Making  Patient upon arrival to ED  appeared to be anxious but comfortable and in no distress stable vital signs.  Discussed with patient presenting complaints and clinical findings.  Reviewed with patient his epic chart and counseled patient on lower leg pain and appropriate approach to management/treatments.  After assessment and evaluation patient's recent work-up and findings were reviewed with the patient and patient was offered plan of care while in the ED.  After returning to further discussed with the patient regarding treatment in the ED patient was no longer present in his room after speaking with front staff patient was witnessed eloping from the ED without informing any way.  At this time again patient has eloped from the emergency department prior to completion of care.    Amount and/or Complexity of Data Reviewed  External Data Reviewed: labs, radiology, ECG and notes.        Procedure  Procedures     Kvng Martinez MD  11/10/23 0151

## 2023-11-21 ENCOUNTER — OFFICE VISIT (OUTPATIENT)
Dept: PRIMARY CARE | Facility: CLINIC | Age: 63
End: 2023-11-21
Payer: COMMERCIAL

## 2023-11-21 VITALS
SYSTOLIC BLOOD PRESSURE: 110 MMHG | WEIGHT: 150 LBS | HEART RATE: 75 BPM | OXYGEN SATURATION: 98 % | BODY MASS INDEX: 23.49 KG/M2 | DIASTOLIC BLOOD PRESSURE: 64 MMHG | TEMPERATURE: 97.5 F

## 2023-11-21 DIAGNOSIS — I25.10 CORONARY ARTERY DISEASE DUE TO LIPID RICH PLAQUE: ICD-10-CM

## 2023-11-21 DIAGNOSIS — L21.9 SEBORRHEIC DERMATITIS OF SCALP: ICD-10-CM

## 2023-11-21 DIAGNOSIS — I25.83 CORONARY ARTERY DISEASE DUE TO LIPID RICH PLAQUE: ICD-10-CM

## 2023-11-21 DIAGNOSIS — F17.219 CIGARETTE NICOTINE DEPENDENCE WITH NICOTINE-INDUCED DISORDER: ICD-10-CM

## 2023-11-21 DIAGNOSIS — E78.00 HYPERCHOLESTEREMIA: ICD-10-CM

## 2023-11-21 DIAGNOSIS — S86.112D RUPTURE OF LEFT GASTROCNEMIUS TENDON, SUBSEQUENT ENCOUNTER: Primary | ICD-10-CM

## 2023-11-21 PROCEDURE — 99214 OFFICE O/P EST MOD 30 MIN: CPT | Performed by: INTERNAL MEDICINE

## 2023-11-21 RX ORDER — KETOCONAZOLE 20 MG/ML
SHAMPOO, SUSPENSION TOPICAL EVERY OTHER DAY
Qty: 230 ML | Refills: 2 | Status: SHIPPED | OUTPATIENT
Start: 2023-11-21

## 2023-11-21 RX ORDER — ROSUVASTATIN CALCIUM 10 MG/1
10 TABLET, COATED ORAL DAILY
Qty: 30 TABLET | Refills: 5 | Status: SHIPPED | OUTPATIENT
Start: 2023-11-21 | End: 2023-11-21 | Stop reason: SINTOL

## 2023-11-21 ASSESSMENT — ENCOUNTER SYMPTOMS
BLOOD IN STOOL: 0
DIARRHEA: 0
DIZZINESS: 0
ARTHRALGIAS: 0
DIFFICULTY URINATING: 0
FEVER: 0
SINUS PAIN: 0
FATIGUE: 0
MYALGIAS: 1
HEADACHES: 0
WHEEZING: 0
BRUISES/BLEEDS EASILY: 0
SORE THROAT: 0
PALPITATIONS: 0
UNEXPECTED WEIGHT CHANGE: 0
COUGH: 0
ABDOMINAL PAIN: 0

## 2023-11-21 NOTE — PROGRESS NOTES
"Subjective   Patient ID: Lamberto Martinez is a 63 y.o. male who presents for Nicotine Dependence, Hyperlipidemia, Med Refill, ER Follow-up (Bilateral swelling and cellulitis of lower extremities), Results, and Flu Vaccine (declined).    -Coronary calcifications  Patient underwent stress test cardiac catheterization awaiting follow-up cardiology no chest pain no shortness of breath follow-up results closely  -Patient patient presented to the office in our office seen by Sarah JULIO practitioner diagnosed with biceps tendinitis then went to the emergency room diagnosed with left leg swelling etiology unclear ultrasound showed no DVT in the left leg 4 days later patient presented back to the emergency room with bruising and swelling patient took pictures of his leg which showing clearly large bruise seeping down to the foot consistent with muscle tendon rupture  Patient reassured healing now  Possible side effect from Crestor  -Crestor side effect and tendon ruptures need to discontinue at this point continue with low-fat diet until reevaluation by cardiology for further recommendation  - Nicotine dependency  \"I spent 3 minutes counseling patient about need for smoking/tobacco cessation and how I can support efforts when patient is ready to quit.  Discussed nicotine replacement therapy, Varenicline, Bupropion, hypnosis, support groups, and accupunture as potential options.  Patient currently has no signs or symptoms of tobacco related disease.\".  -Patient  on Zyban 150 mg twice a day counseled about use and side effects  - Low-dose CT scanning stable repeat in August 2024  - Lung emphysema counseled for smoking cessation  -- Underlying sleep apnea need to proceed with sleep testing as recommended follow-up sleep clinic  -History of lumbosacral surgery and chronic right lower extremity sciatica compensated on Lyrica and tizanidine follow-up pain management as recommended  - Psoriasis continue with current local creams " "steroid cream betamethasone  - Seborrheic dermatitis continue with ketoconazole  Follow-up 3 months      Nicotine Dependence  Symptoms are negative for fatigue and sore throat.   Hyperlipidemia  Associated symptoms include myalgias. Pertinent negatives include no chest pain.   Med Refill  Associated symptoms include myalgias. Pertinent negatives include no abdominal pain, arthralgias, chest pain, congestion, coughing, fatigue, fever, headaches, rash or sore throat.   ER Follow-up  Associated symptoms include myalgias. Pertinent negatives include no abdominal pain, arthralgias, chest pain, congestion, coughing, fatigue, fever, headaches, rash or sore throat.          Review of Systems   Constitutional:  Negative for fatigue, fever and unexpected weight change.   HENT:  Negative for congestion, ear discharge, ear pain, mouth sores, sinus pain and sore throat.    Eyes:  Negative for visual disturbance.   Respiratory:  Negative for cough and wheezing.    Cardiovascular:  Negative for chest pain, palpitations and leg swelling.   Gastrointestinal:  Negative for abdominal pain, blood in stool and diarrhea.   Genitourinary:  Negative for difficulty urinating.   Musculoskeletal:  Positive for myalgias. Negative for arthralgias.   Skin:  Negative for rash.   Neurological:  Negative for dizziness and headaches.   Hematological:  Does not bruise/bleed easily.   Psychiatric/Behavioral:  Negative for behavioral problems.    All other systems reviewed and are negative.      Objective   No results found for: \"HGBA1C\"   /64   Pulse 75   Temp 36.4 °C (97.5 °F)   Wt 68 kg (150 lb)   SpO2 98%   BMI 23.49 kg/m²     Physical Exam  Vitals and nursing note reviewed.   Constitutional:       Appearance: Normal appearance.   HENT:      Head: Normocephalic.      Nose: Nose normal.   Eyes:      Conjunctiva/sclera: Conjunctivae normal.      Pupils: Pupils are equal, round, and reactive to light.   Cardiovascular:      Rate and Rhythm: " Regular rhythm.   Pulmonary:      Effort: Pulmonary effort is normal.      Breath sounds: Normal breath sounds.   Abdominal:      General: Abdomen is flat.      Palpations: Abdomen is soft.   Musculoskeletal:         General: Tenderness (The ankle bruise ankle movement intact in all directions without pain) present.      Cervical back: Neck supple.   Skin:     General: Skin is warm.      Findings: Rash present.   Neurological:      General: No focal deficit present.      Mental Status: He is oriented to person, place, and time.   Psychiatric:         Mood and Affect: Mood normal.         Assessment/Plan   Lamberto was seen today for nicotine dependence, hyperlipidemia, med refill, er follow-up, results and flu vaccine.  Diagnoses and all orders for this visit:  Rupture of left gastrocnemius tendon, subsequent encounter (Primary)  Cigarette nicotine dependence with nicotine-induced disorder  Seborrheic dermatitis of scalp  -     ketoconazole (NIZOral) 2 % shampoo; Apply topically every other day. Apply to scalp every other day for 5 minutes and rinse  Hypercholesteremia  -     Discontinue: rosuvastatin (Crestor) 10 mg tablet; Take 1 tablet (10 mg) by mouth once daily.  Coronary artery disease due to lipid rich plaque   -Coronary calcifications  Patient underwent stress test cardiac catheterization awaiting follow-up cardiology no chest pain no shortness of breath follow-up results closely  -Patient patient presented to the office in our office seen by Sarah JULIO practitioner diagnosed with biceps tendinitis then went to the emergency room diagnosed with left leg swelling etiology unclear ultrasound showed no DVT in the left leg 4 days later patient presented back to the emergency room with bruising and swelling patient took pictures of his leg which showing clearly large bruise seeping down to the foot consistent with muscle tendon rupture  Patient reassured healing now  Possible side effect from Crestor  -Crestor side  "effect and tendon ruptures need to discontinue at this point continue with low-fat diet until reevaluation by cardiology for further recommendation  - Nicotine dependency  \"I spent 3 minutes counseling patient about need for smoking/tobacco cessation and how I can support efforts when patient is ready to quit.  Discussed nicotine replacement therapy, Varenicline, Bupropion, hypnosis, support groups, and accupunture as potential options.  Patient currently has no signs or symptoms of tobacco related disease.\".  -Patient  on Zyban 150 mg twice a day counseled about use and side effects  - Low-dose CT scanning stable repeat in August 2024  - Lung emphysema counseled for smoking cessation  -- Underlying sleep apnea need to proceed with sleep testing as recommended follow-up sleep clinic  -History of lumbosacral surgery and chronic right lower extremity sciatica compensated on Lyrica and tizanidine follow-up pain management as recommended  - Psoriasis continue with current local creams steroid cream betamethasone  - Seborrheic dermatitis continue with ketoconazole  Follow-up 3 months    "

## 2023-12-07 ENCOUNTER — LAB (OUTPATIENT)
Dept: LAB | Facility: LAB | Age: 63
End: 2023-12-07
Payer: COMMERCIAL

## 2023-12-07 DIAGNOSIS — M54.50 LOW BACK PAIN, UNSPECIFIED: Primary | ICD-10-CM

## 2023-12-07 LAB
ANION GAP SERPL CALC-SCNC: 11 MMOL/L (ref 10–20)
BUN SERPL-MCNC: 16 MG/DL (ref 6–23)
CALCIUM SERPL-MCNC: 9 MG/DL (ref 8.6–10.3)
CHLORIDE SERPL-SCNC: 103 MMOL/L (ref 98–107)
CO2 SERPL-SCNC: 29 MMOL/L (ref 21–32)
CREAT SERPL-MCNC: 0.77 MG/DL (ref 0.5–1.3)
GFR SERPL CREATININE-BSD FRML MDRD: >90 ML/MIN/1.73M*2
GLUCOSE SERPL-MCNC: 128 MG/DL (ref 74–99)
POTASSIUM SERPL-SCNC: 4.2 MMOL/L (ref 3.5–5.3)
SODIUM SERPL-SCNC: 139 MMOL/L (ref 136–145)

## 2023-12-07 PROCEDURE — 36415 COLL VENOUS BLD VENIPUNCTURE: CPT

## 2023-12-21 ENCOUNTER — OFFICE VISIT (OUTPATIENT)
Dept: PRIMARY CARE | Facility: CLINIC | Age: 63
End: 2023-12-21
Payer: COMMERCIAL

## 2023-12-21 VITALS
HEART RATE: 72 BPM | DIASTOLIC BLOOD PRESSURE: 60 MMHG | SYSTOLIC BLOOD PRESSURE: 100 MMHG | TEMPERATURE: 97.5 F | BODY MASS INDEX: 23.71 KG/M2 | OXYGEN SATURATION: 96 % | WEIGHT: 151.4 LBS

## 2023-12-21 DIAGNOSIS — I10 HYPERTENSION, UNSPECIFIED TYPE: ICD-10-CM

## 2023-12-21 DIAGNOSIS — E55.9 VITAMIN D DEFICIENCY, UNSPECIFIED: ICD-10-CM

## 2023-12-21 DIAGNOSIS — E78.00 HYPERCHOLESTEREMIA: ICD-10-CM

## 2023-12-21 DIAGNOSIS — M54.2 CERVICALGIA: Primary | ICD-10-CM

## 2023-12-21 DIAGNOSIS — Z79.899 HIGH RISK MEDICATION USE: ICD-10-CM

## 2023-12-21 DIAGNOSIS — Z12.5 SCREENING FOR PROSTATE CANCER: ICD-10-CM

## 2023-12-21 DIAGNOSIS — F17.219 CIGARETTE NICOTINE DEPENDENCE WITH NICOTINE-INDUCED DISORDER: ICD-10-CM

## 2023-12-21 DIAGNOSIS — E53.8 VITAMIN B12 DEFICIENCY: ICD-10-CM

## 2023-12-21 DIAGNOSIS — I65.22 ATHEROSCLEROSIS OF LEFT CAROTID ARTERY: ICD-10-CM

## 2023-12-21 DIAGNOSIS — R53.83 OTHER FATIGUE: ICD-10-CM

## 2023-12-21 PROCEDURE — 3074F SYST BP LT 130 MM HG: CPT | Performed by: INTERNAL MEDICINE

## 2023-12-21 PROCEDURE — 99214 OFFICE O/P EST MOD 30 MIN: CPT | Performed by: INTERNAL MEDICINE

## 2023-12-21 PROCEDURE — 3078F DIAST BP <80 MM HG: CPT | Performed by: INTERNAL MEDICINE

## 2023-12-21 RX ORDER — ROSUVASTATIN CALCIUM 5 MG/1
5 TABLET, COATED ORAL DAILY
COMMUNITY
Start: 2023-11-30

## 2023-12-21 RX ORDER — BUPROPION HYDROCHLORIDE 150 MG/1
150 TABLET, EXTENDED RELEASE ORAL 2 TIMES DAILY
Qty: 60 TABLET | Refills: 1 | Status: SHIPPED | OUTPATIENT
Start: 2023-12-21 | End: 2024-02-19

## 2023-12-21 ASSESSMENT — ENCOUNTER SYMPTOMS
WHEEZING: 0
SINUS PAIN: 0
ARTHRALGIAS: 0
UNEXPECTED WEIGHT CHANGE: 0
SORE THROAT: 0
PALPITATIONS: 0
BRUISES/BLEEDS EASILY: 0
DIFFICULTY URINATING: 0
NECK PAIN: 1
FATIGUE: 0
HEADACHES: 0
ABDOMINAL PAIN: 0
DIZZINESS: 0
BLOOD IN STOOL: 0
DIARRHEA: 0
COUGH: 0
FEVER: 0

## 2023-12-21 NOTE — PROGRESS NOTES
"Subjective   Patient ID: Lamberto Martinez is a 63 y.o. male who presents for Follow-up (1 month follow up ) and Neck Pain (Soreness and stiffness).    -Neck pain musculoskeletal arthritis counseled about stretching exercises to the neck muscles follow-up if no improvement no radiculopathy at this time patient avoid heavy lifting  - Coronary calcifications, patient cardiac workup negative for reversible ischemia cardiac catheterization no significant disease  - Mild carotid atherosclerosis continue with low-fat diet  --Crestor side effect tendon rupture seen by cardiology patient switched to Crestor 5 mg daily follow-up lipid profile  -- Nicotine dependency  \"I spent 3 minutes counseling patient about need for smoking/tobacco cessation and how I can support efforts when patient is ready to quit.  Discussed nicotine replacement therapy, Varenicline, Bupropion, hypnosis, support groups, and accupunture as potential options.  Patient currently has no signs or symptoms of tobacco related disease.\".  -Patient  on Zyban 150 mg twice a day counseled about use and side effects  - Low-dose CT scanning stable repeat in August 2024  - Lung emphysema counseled for smoking cessation  -- Underlying sleep apnea need to proceed with sleep testing as recommended follow-up sleep clinic  -History of lumbosacral surgery and chronic right lower extremity sciatica compensated on Lyrica and tizanidine follow-up pain management as recommended  - Psoriasis continue with current local creams steroid cream betamethasone    - Needs complete blood work Medicare physical in 6 months      Neck Pain   Pertinent negatives include no chest pain, fever or headaches.          Review of Systems   Constitutional:  Negative for fatigue, fever and unexpected weight change.   HENT:  Negative for congestion, ear discharge, ear pain, mouth sores, sinus pain and sore throat.    Eyes:  Negative for visual disturbance.   Respiratory:  Negative for cough and " "wheezing.    Cardiovascular:  Negative for chest pain, palpitations and leg swelling.   Gastrointestinal:  Negative for abdominal pain, blood in stool and diarrhea.   Genitourinary:  Negative for difficulty urinating.   Musculoskeletal:  Positive for neck pain. Negative for arthralgias.   Skin:  Negative for rash.   Neurological:  Negative for dizziness and headaches.   Hematological:  Does not bruise/bleed easily.   Psychiatric/Behavioral:  Negative for behavioral problems.    All other systems reviewed and are negative.      Objective   No results found for: \"HGBA1C\"   /60   Pulse 72   Temp 36.4 °C (97.5 °F)   Wt 68.7 kg (151 lb 6.4 oz)   SpO2 96%   BMI 23.71 kg/m²   Lab Results   Component Value Date    WBC 8.9 07/08/2023    HGB 15.1 07/08/2023    HCT 46.5 07/08/2023     07/08/2023    CHOL 139 07/08/2023    TRIG 99 07/08/2023    HDL 34.0 (A) 07/08/2023    ALT 11 07/08/2023    AST 15 07/08/2023     12/07/2023    K 4.2 12/07/2023     12/07/2023    CREATININE 0.77 12/07/2023    BUN 16 12/07/2023    CO2 29 12/07/2023    TSH 1.70 07/08/2023    PSA 7.71 (H) 07/08/2023    INR 1.0 03/02/2022     par   Physical Exam  Vitals and nursing note reviewed.   Constitutional:       Appearance: Normal appearance.   HENT:      Head: Normocephalic.      Nose: Nose normal.   Eyes:      Conjunctiva/sclera: Conjunctivae normal.      Pupils: Pupils are equal, round, and reactive to light.   Cardiovascular:      Rate and Rhythm: Regular rhythm.   Pulmonary:      Effort: Pulmonary effort is normal.      Breath sounds: Normal breath sounds.   Abdominal:      General: Abdomen is flat.      Palpations: Abdomen is soft.   Musculoskeletal:         General: Tenderness (Cervical muscle tenderness) present.      Cervical back: Neck supple.   Skin:     General: Skin is warm.   Neurological:      General: No focal deficit present.      Mental Status: He is oriented to person, place, and time.   Psychiatric:         " "Mood and Affect: Mood normal.         Assessment/Plan   Lamberto was seen today for follow-up and neck pain.  Diagnoses and all orders for this visit:  Cervicalgia (Primary)  Atherosclerosis of left carotid artery  Cigarette nicotine dependence with nicotine-induced disorder  -     buPROPion SR (Wellbutrin SR) 150 mg 12 hr tablet; Take 1 tablet (150 mg) by mouth 2 times a day. Do not crush, chew, or split.  High risk medication use  -     CBC and Auto Differential; Future  Hypertension, unspecified type  -     Comprehensive Metabolic Panel; Future  Hypercholesteremia  -     Lipid Panel; Future  Screening for prostate cancer  -     Prostate Specific Antigen, Screen; Future  Other fatigue  -     TSH with reflex to Free T4 if abnormal; Future  Vitamin B12 deficiency  -     Vitamin B12; Future  Vitamin D deficiency, unspecified  -     Vitamin D 25-Hydroxy,Total (for eval of Vitamin D levels); Future  Other orders  -     Follow Up In Primary Care - Medicare Annual; Future   Neck pain musculoskeletal arthritis counseled about stretching exercises to the neck muscles follow-up if no improvement no radiculopathy at this time patient avoid heavy lifting  - Coronary calcifications, patient cardiac workup negative for reversible ischemia cardiac catheterization no significant disease  - Mild carotid atherosclerosis continue with low-fat diet  --Crestor side effect tendon rupture seen by cardiology patient switched to Crestor 5 mg daily follow-up lipid profile  -- Nicotine dependency  \"I spent 3 minutes counseling patient about need for smoking/tobacco cessation and how I can support efforts when patient is ready to quit.  Discussed nicotine replacement therapy, Varenicline, Bupropion, hypnosis, support groups, and accupunture as potential options.  Patient currently has no signs or symptoms of tobacco related disease.\".  -Patient  on Zyban 150 mg twice a day counseled about use and side effects  - Low-dose CT scanning stable " repeat in August 2024  - Lung emphysema counseled for smoking cessation  -- Underlying sleep apnea need to proceed with sleep testing as recommended follow-up sleep clinic  -History of lumbosacral surgery and chronic right lower extremity sciatica compensated on Lyrica and tizanidine follow-up pain management as recommended  - Psoriasis continue with current local creams steroid cream betamethasone    - Needs complete blood work Medicare physical in 6 months

## 2023-12-28 ENCOUNTER — APPOINTMENT (OUTPATIENT)
Dept: RADIOLOGY | Facility: HOSPITAL | Age: 63
End: 2023-12-28
Payer: COMMERCIAL

## 2024-01-02 ENCOUNTER — HOSPITAL ENCOUNTER (OUTPATIENT)
Dept: RADIOLOGY | Facility: HOSPITAL | Age: 64
Discharge: HOME | End: 2024-01-02
Payer: COMMERCIAL

## 2024-01-02 DIAGNOSIS — R06.02 SHORTNESS OF BREATH: ICD-10-CM

## 2024-01-02 DIAGNOSIS — Z82.49 FAMILY HISTORY OF ISCHEMIC HEART DISEASE AND OTHER DISEASES OF THE CIRCULATORY SYSTEM: ICD-10-CM

## 2024-01-02 PROCEDURE — 75571 CT HRT W/O DYE W/CA TEST: CPT

## 2024-02-14 DIAGNOSIS — M54.17 LUMBOSACRAL RADICULITIS: ICD-10-CM

## 2024-06-18 ENCOUNTER — APPOINTMENT (OUTPATIENT)
Dept: PRIMARY CARE | Facility: CLINIC | Age: 64
End: 2024-06-18
Payer: COMMERCIAL

## 2024-06-18 VITALS
WEIGHT: 149.6 LBS | DIASTOLIC BLOOD PRESSURE: 62 MMHG | SYSTOLIC BLOOD PRESSURE: 100 MMHG | OXYGEN SATURATION: 97 % | BODY MASS INDEX: 23.48 KG/M2 | HEART RATE: 73 BPM | TEMPERATURE: 97.7 F | HEIGHT: 67 IN

## 2024-06-18 DIAGNOSIS — E55.9 VITAMIN D DEFICIENCY, UNSPECIFIED: ICD-10-CM

## 2024-06-18 DIAGNOSIS — Z12.2 ENCOUNTER FOR SCREENING FOR LUNG CANCER: ICD-10-CM

## 2024-06-18 DIAGNOSIS — I25.83 CORONARY ARTERY DISEASE DUE TO LIPID RICH PLAQUE: ICD-10-CM

## 2024-06-18 DIAGNOSIS — Z00.00 ANNUAL PHYSICAL EXAM: Primary | ICD-10-CM

## 2024-06-18 DIAGNOSIS — Z11.59 NEED FOR HEPATITIS C SCREENING TEST: ICD-10-CM

## 2024-06-18 DIAGNOSIS — I25.10 CORONARY ARTERY DISEASE DUE TO LIPID RICH PLAQUE: ICD-10-CM

## 2024-06-18 DIAGNOSIS — F17.219 CIGARETTE NICOTINE DEPENDENCE WITH NICOTINE-INDUCED DISORDER: ICD-10-CM

## 2024-06-18 DIAGNOSIS — E78.00 HYPERCHOLESTEREMIA: ICD-10-CM

## 2024-06-18 DIAGNOSIS — I65.22 ATHEROSCLEROSIS OF LEFT CAROTID ARTERY: ICD-10-CM

## 2024-06-18 PROCEDURE — 99214 OFFICE O/P EST MOD 30 MIN: CPT | Performed by: INTERNAL MEDICINE

## 2024-06-18 PROCEDURE — 3074F SYST BP LT 130 MM HG: CPT | Performed by: INTERNAL MEDICINE

## 2024-06-18 PROCEDURE — 99396 PREV VISIT EST AGE 40-64: CPT | Performed by: INTERNAL MEDICINE

## 2024-06-18 PROCEDURE — 3078F DIAST BP <80 MM HG: CPT | Performed by: INTERNAL MEDICINE

## 2024-06-18 RX ORDER — VIT C/E/ZN/COPPR/LUTEIN/ZEAXAN 250MG-90MG
25 CAPSULE ORAL DAILY
COMMUNITY

## 2024-06-18 RX ORDER — PNEUMOCOCCAL 20-VALENT CONJUGATE VACCINE 2.2; 2.2; 2.2; 2.2; 2.2; 2.2; 2.2; 2.2; 2.2; 2.2; 2.2; 2.2; 2.2; 2.2; 2.2; 2.2; 4.4; 2.2; 2.2; 2.2 UG/.5ML; UG/.5ML; UG/.5ML; UG/.5ML; UG/.5ML; UG/.5ML; UG/.5ML; UG/.5ML; UG/.5ML; UG/.5ML; UG/.5ML; UG/.5ML; UG/.5ML; UG/.5ML; UG/.5ML; UG/.5ML; UG/.5ML; UG/.5ML; UG/.5ML; UG/.5ML
0.5 INJECTION, SUSPENSION INTRAMUSCULAR ONCE
Qty: 0.5 ML | Refills: 0 | Status: SHIPPED | OUTPATIENT
Start: 2024-06-18 | End: 2024-06-18

## 2024-06-18 RX ORDER — PSYLLIUM HUSK 3.4 G/7G
POWDER ORAL
COMMUNITY

## 2024-06-18 ASSESSMENT — ENCOUNTER SYMPTOMS
DIZZINESS: 0
BLOOD IN STOOL: 0
FATIGUE: 0
UNEXPECTED WEIGHT CHANGE: 0
SORE THROAT: 0
ARTHRALGIAS: 0
SINUS PAIN: 0
DIFFICULTY URINATING: 0
PALPITATIONS: 0
BRUISES/BLEEDS EASILY: 0
COUGH: 0
HEADACHES: 0
ABDOMINAL PAIN: 0
DIARRHEA: 0
WHEEZING: 0
FEVER: 0

## 2024-06-18 ASSESSMENT — ANXIETY QUESTIONNAIRES
4. TROUBLE RELAXING: NOT AT ALL
6. BECOMING EASILY ANNOYED OR IRRITABLE: NOT AT ALL
7. FEELING AFRAID AS IF SOMETHING AWFUL MIGHT HAPPEN: NOT AT ALL
5. BEING SO RESTLESS THAT IT IS HARD TO SIT STILL: NOT AT ALL
3. WORRYING TOO MUCH ABOUT DIFFERENT THINGS: NOT AT ALL
1. FEELING NERVOUS, ANXIOUS, OR ON EDGE: NOT AT ALL
GAD7 TOTAL SCORE: 0
2. NOT BEING ABLE TO STOP OR CONTROL WORRYING: NOT AT ALL

## 2024-06-18 NOTE — PROGRESS NOTES
"Subjective   Patient ID: Lamberto Martinez is a 64 y.o. male who presents for Annual Exam, leg cramps continue (Since back surgery 3/2022, requests tizanidine), and thumb pain (Left thumb snaps).    Annual preventive visit  - Vaccinations reviewed needs a booster for tetanus vaccine and pneumonia vaccine order placed today  - Screening for colon cancer obtained in 2019 follow-up gastroenterology in [facility]  -Nicotine dependency  Patient failed multiple treatment to quit smoking  \"I spent more 10 minutes counseling patient about need for smoking/tobacco cessation and how I can support efforts when patient is ready to quit.  Discussed nicotine replacement therapy, Varenicline, Bupropion, hypnosis, support groups, and accupunture as potential options.  Patient currently has no signs or symptoms of tobacco related disease.\".  -Needs a follow-up screening for lung cancer  I discussed smoking history, patient meets criteria for lung cancer screening with low-dose CT scan. By using shared decision making we determined that patient would benefit from that screening including a discussion of benefits and harms of screening, follow-up diagnostic testing, over diagnosis, false positive  rate and total radiation exposure  I counseled the patient about the  importance of adhering to annual lung cancer low-dose CT screening, the impact of comorbidities and his  ability or willingness to undergo diagnosis and treatment if abnormality discovered. I provided patient an order for low-dose CT lung cancer screening.    Screening for depression negative    Follow-up  - Coronary calcifications, patient cardiac workup negative for reversible ischemia cardiac catheterization no significant disease  - Mild carotid atherosclerosis continue with low-fat diet follow-up lipid profile  --Crestor side effect tendon rupture seen by cardiology patient switched to Crestor 5 mg daily follow-up lipid profile  -- Nicotine dependency refer " "patient to nicotine cessation consultation and further management  \"- Lung emphysema counseled for smoking cessation  -- Underlying sleep apnea need to proceed with sleep testing as recommended follow-up sleep clinic  -History of lumbosacral surgery and chronic right lower extremity sciatica compensated on Lyrica and tizanidine follow-up pain management as recommended  - Psoriasis continue with current local creams steroid cream betamethasone  Follow-up 6 months             Review of Systems   Constitutional:  Negative for fatigue, fever and unexpected weight change.   HENT:  Negative for congestion, ear discharge, ear pain, mouth sores, sinus pain and sore throat.    Eyes:  Negative for visual disturbance.   Respiratory:  Negative for cough and wheezing.    Cardiovascular:  Negative for chest pain, palpitations and leg swelling.   Gastrointestinal:  Negative for abdominal pain, blood in stool and diarrhea.   Genitourinary:  Negative for difficulty urinating.   Musculoskeletal:  Negative for arthralgias.   Skin:  Negative for rash.   Neurological:  Negative for dizziness and headaches.   Hematological:  Does not bruise/bleed easily.   Psychiatric/Behavioral:  Negative for behavioral problems.    All other systems reviewed and are negative.      Objective   No results found for: \"HGBA1C\"   /62   Pulse 73   Temp 36.5 °C (97.7 °F)   Ht 1.702 m (5' 7\")   Wt 67.9 kg (149 lb 9.6 oz)   SpO2 97%   BMI 23.43 kg/m²   Lab Results   Component Value Date    WBC 8.9 07/08/2023    HGB 15.1 07/08/2023    HCT 46.5 07/08/2023     07/08/2023    CHOL 139 07/08/2023    TRIG 99 07/08/2023    HDL 34.0 (A) 07/08/2023    ALT 11 07/08/2023    AST 15 07/08/2023     12/07/2023    K 4.2 12/07/2023     12/07/2023    CREATININE 0.77 12/07/2023    BUN 16 12/07/2023    CO2 29 12/07/2023    TSH 1.70 07/08/2023    PSA 7.71 (H) 07/08/2023    INR 1.0 03/02/2022     par   Physical Exam  Vitals and nursing note reviewed. "   Constitutional:       Appearance: Normal appearance.   HENT:      Head: Normocephalic.      Nose: Nose normal.   Eyes:      Conjunctiva/sclera: Conjunctivae normal.      Pupils: Pupils are equal, round, and reactive to light.   Cardiovascular:      Rate and Rhythm: Regular rhythm.   Pulmonary:      Effort: Pulmonary effort is normal.      Breath sounds: Normal breath sounds.   Abdominal:      General: Abdomen is flat.      Palpations: Abdomen is soft.   Musculoskeletal:      Cervical back: Neck supple.   Skin:     General: Skin is warm.   Neurological:      General: No focal deficit present.      Mental Status: He is oriented to person, place, and time.   Psychiatric:         Mood and Affect: Mood normal.         Assessment/Plan   Lamberto was seen today for annual exam, leg cramps continue and thumb pain.  Diagnoses and all orders for this visit:  Annual physical exam (Primary)  -     CBC and Auto Differential; Future  -     Comprehensive Metabolic Panel; Future  -     Hemoglobin A1C; Future  -     Lipid Panel; Future  -     Magnesium; Future  -     Prostate Specific Antigen; Future  -     TSH with reflex to Free T4 if abnormal; Future  -     Vitamin D 25-Hydroxy,Total (for eval of Vitamin D levels); Future  -     pneumoc 20-yuliet conj-dip cr,PF, (Prevnar 20, PF,) 0.5 mL vaccine; Inject 0.5 mL into the muscle 1 time for 1 dose.  -     Hepatitis C antibody; Future  -     diphth,pertus,acell,,tetanus (BoostRIX) 2.5-8-5 Lf-mcg-Lf/0.5mL injection; Inject 0.5 mL into the muscle 1 time for 1 dose.  Encounter for screening for lung cancer  -     CT lung screening low dose; Future  Cigarette nicotine dependence with nicotine-induced disorder  -     CT lung screening low dose; Future  -     Referral to Tobacco Cessation Counseling; Future  Atherosclerosis of left carotid artery  Hypercholesteremia  Vitamin D deficiency, unspecified  Coronary artery disease due to lipid rich plaque  Need for hepatitis C screening test  -      "Hepatitis C antibody; Future  Other orders  -     Follow Up In Primary Care - Medicare Annual  -     Follow Up In Primary Care - Established; Future  -     Follow Up In Primary Care - Established; Future   nnual preventive visit  - Vaccinations reviewed needs a booster for tetanus vaccine and pneumonia vaccine order placed today  - Screening for colon cancer obtained in 2019 follow-up gastroenterology in [facility]  -Nicotine dependency  Patient failed multiple treatment to quit smoking  \"I spent more 10 minutes counseling patient about need for smoking/tobacco cessation and how I can support efforts when patient is ready to quit.  Discussed nicotine replacement therapy, Varenicline, Bupropion, hypnosis, support groups, and accupunture as potential options.  Patient currently has no signs or symptoms of tobacco related disease.\".  -Needs a follow-up screening for lung cancer  I discussed smoking history, patient meets criteria for lung cancer screening with low-dose CT scan. By using shared decision making we determined that patient would benefit from that screening including a discussion of benefits and harms of screening, follow-up diagnostic testing, over diagnosis, false positive  rate and total radiation exposure  I counseled the patient about the  importance of adhering to annual lung cancer low-dose CT screening, the impact of comorbidities and his  ability or willingness to undergo diagnosis and treatment if abnormality discovered. I provided patient an order for low-dose CT lung cancer screening.    Screening for depression negative    Follow-up  - Coronary calcifications, patient cardiac workup negative for reversible ischemia cardiac catheterization no significant disease  - Mild carotid atherosclerosis continue with low-fat diet follow-up lipid profile  --Crestor side effect tendon rupture seen by cardiology patient switched to Crestor 5 mg daily follow-up lipid profile  -- Nicotine dependency " "refer patient to nicotine cessation consultation and further management  \"- Lung emphysema counseled for smoking cessation  -- Underlying sleep apnea need to proceed with sleep testing as recommended follow-up sleep clinic  -History of lumbosacral surgery and chronic right lower extremity sciatica compensated on Lyrica and tizanidine follow-up pain management as recommended  - Psoriasis continue with current local creams steroid cream betamethasone  Follow-up 6 months    "

## 2024-12-17 ENCOUNTER — LAB (OUTPATIENT)
Dept: LAB | Facility: LAB | Age: 64
End: 2024-12-17
Payer: COMMERCIAL

## 2024-12-17 DIAGNOSIS — E78.00 HYPERCHOLESTEREMIA: ICD-10-CM

## 2024-12-17 DIAGNOSIS — E55.9 VITAMIN D DEFICIENCY, UNSPECIFIED: ICD-10-CM

## 2024-12-17 DIAGNOSIS — Z11.59 NEED FOR HEPATITIS C SCREENING TEST: ICD-10-CM

## 2024-12-17 DIAGNOSIS — Z00.00 ANNUAL PHYSICAL EXAM: ICD-10-CM

## 2024-12-17 DIAGNOSIS — R53.83 OTHER FATIGUE: ICD-10-CM

## 2024-12-17 DIAGNOSIS — E53.8 VITAMIN B12 DEFICIENCY: ICD-10-CM

## 2024-12-17 DIAGNOSIS — I10 HYPERTENSION, UNSPECIFIED TYPE: ICD-10-CM

## 2024-12-17 DIAGNOSIS — Z12.5 SCREENING FOR PROSTATE CANCER: ICD-10-CM

## 2024-12-17 DIAGNOSIS — Z79.899 HIGH RISK MEDICATION USE: ICD-10-CM

## 2024-12-17 LAB
25(OH)D3 SERPL-MCNC: 43 NG/ML (ref 30–100)
ALBUMIN SERPL BCP-MCNC: 4.5 G/DL (ref 3.4–5)
ALP SERPL-CCNC: 65 U/L (ref 33–136)
ALT SERPL W P-5'-P-CCNC: 13 U/L (ref 10–52)
ANION GAP SERPL CALC-SCNC: 10 MMOL/L (ref 10–20)
AST SERPL W P-5'-P-CCNC: 16 U/L (ref 9–39)
BASOPHILS # BLD AUTO: 0.07 X10*3/UL (ref 0–0.1)
BASOPHILS NFR BLD AUTO: 0.8 %
BILIRUB SERPL-MCNC: 0.6 MG/DL (ref 0–1.2)
BUN SERPL-MCNC: 20 MG/DL (ref 6–23)
CALCIUM SERPL-MCNC: 9.7 MG/DL (ref 8.6–10.3)
CHLORIDE SERPL-SCNC: 105 MMOL/L (ref 98–107)
CHOLEST SERPL-MCNC: 121 MG/DL (ref 0–199)
CHOLESTEROL/HDL RATIO: 3.2
CO2 SERPL-SCNC: 31 MMOL/L (ref 21–32)
CREAT SERPL-MCNC: 0.72 MG/DL (ref 0.5–1.3)
EGFRCR SERPLBLD CKD-EPI 2021: >90 ML/MIN/1.73M*2
EOSINOPHIL # BLD AUTO: 0.2 X10*3/UL (ref 0–0.7)
EOSINOPHIL NFR BLD AUTO: 2.2 %
ERYTHROCYTE [DISTWIDTH] IN BLOOD BY AUTOMATED COUNT: 12.7 % (ref 11.5–14.5)
EST. AVERAGE GLUCOSE BLD GHB EST-MCNC: 105 MG/DL
GLUCOSE SERPL-MCNC: 91 MG/DL (ref 74–99)
HBA1C MFR BLD: 5.3 %
HCT VFR BLD AUTO: 47.3 % (ref 41–52)
HCV AB SER QL: NONREACTIVE
HDLC SERPL-MCNC: 37.6 MG/DL
HGB BLD-MCNC: 15.5 G/DL (ref 13.5–17.5)
IMM GRANULOCYTES # BLD AUTO: 0.04 X10*3/UL (ref 0–0.7)
IMM GRANULOCYTES NFR BLD AUTO: 0.4 % (ref 0–0.9)
LDLC SERPL CALC-MCNC: 67 MG/DL
LYMPHOCYTES # BLD AUTO: 2.11 X10*3/UL (ref 1.2–4.8)
LYMPHOCYTES NFR BLD AUTO: 23.5 %
MAGNESIUM SERPL-MCNC: 2.04 MG/DL (ref 1.6–2.4)
MCH RBC QN AUTO: 30.9 PG (ref 26–34)
MCHC RBC AUTO-ENTMCNC: 32.8 G/DL (ref 32–36)
MCV RBC AUTO: 94 FL (ref 80–100)
MONOCYTES # BLD AUTO: 0.93 X10*3/UL (ref 0.1–1)
MONOCYTES NFR BLD AUTO: 10.4 %
NEUTROPHILS # BLD AUTO: 5.61 X10*3/UL (ref 1.2–7.7)
NEUTROPHILS NFR BLD AUTO: 62.7 %
NON HDL CHOLESTEROL: 83 MG/DL (ref 0–149)
NRBC BLD-RTO: 0 /100 WBCS (ref 0–0)
PLATELET # BLD AUTO: 341 X10*3/UL (ref 150–450)
POTASSIUM SERPL-SCNC: 4.4 MMOL/L (ref 3.5–5.3)
PROT SERPL-MCNC: 6.8 G/DL (ref 6.4–8.2)
PSA SERPL-MCNC: 5.88 NG/ML
PSA SERPL-MCNC: 5.88 NG/ML
RBC # BLD AUTO: 5.02 X10*6/UL (ref 4.5–5.9)
SODIUM SERPL-SCNC: 142 MMOL/L (ref 136–145)
TRIGL SERPL-MCNC: 82 MG/DL (ref 0–149)
TSH SERPL-ACNC: 1.27 MIU/L (ref 0.44–3.98)
VIT B12 SERPL-MCNC: 208 PG/ML (ref 211–911)
VLDL: 16 MG/DL (ref 0–40)
WBC # BLD AUTO: 9 X10*3/UL (ref 4.4–11.3)

## 2024-12-17 PROCEDURE — 82306 VITAMIN D 25 HYDROXY: CPT

## 2024-12-17 PROCEDURE — 86803 HEPATITIS C AB TEST: CPT

## 2024-12-17 PROCEDURE — 36415 COLL VENOUS BLD VENIPUNCTURE: CPT

## 2024-12-17 PROCEDURE — 84153 ASSAY OF PSA TOTAL: CPT

## 2024-12-17 PROCEDURE — 83036 HEMOGLOBIN GLYCOSYLATED A1C: CPT

## 2024-12-17 PROCEDURE — 82607 VITAMIN B-12: CPT

## 2024-12-18 ENCOUNTER — APPOINTMENT (OUTPATIENT)
Dept: PRIMARY CARE | Facility: CLINIC | Age: 64
End: 2024-12-18
Payer: COMMERCIAL

## 2024-12-18 VITALS
OXYGEN SATURATION: 96 % | DIASTOLIC BLOOD PRESSURE: 72 MMHG | SYSTOLIC BLOOD PRESSURE: 120 MMHG | TEMPERATURE: 96.9 F | WEIGHT: 149.8 LBS | HEART RATE: 77 BPM | BODY MASS INDEX: 23.46 KG/M2

## 2024-12-18 DIAGNOSIS — I25.83 CORONARY ARTERY DISEASE DUE TO LIPID RICH PLAQUE: ICD-10-CM

## 2024-12-18 DIAGNOSIS — I25.10 CORONARY ARTERY DISEASE DUE TO LIPID RICH PLAQUE: ICD-10-CM

## 2024-12-18 DIAGNOSIS — M79.671 RIGHT FOOT PAIN: ICD-10-CM

## 2024-12-18 DIAGNOSIS — E78.00 HYPERCHOLESTEREMIA: ICD-10-CM

## 2024-12-18 DIAGNOSIS — Z71.6 ENCOUNTER FOR SMOKING CESSATION COUNSELING: ICD-10-CM

## 2024-12-18 DIAGNOSIS — Z00.00 ANNUAL PHYSICAL EXAM: Primary | ICD-10-CM

## 2024-12-18 DIAGNOSIS — I65.22 ATHEROSCLEROSIS OF LEFT CAROTID ARTERY: ICD-10-CM

## 2024-12-18 DIAGNOSIS — R97.20 ELEVATED PSA: ICD-10-CM

## 2024-12-18 DIAGNOSIS — M54.17 LUMBOSACRAL RADICULITIS: ICD-10-CM

## 2024-12-18 DIAGNOSIS — F17.219 CIGARETTE NICOTINE DEPENDENCE WITH NICOTINE-INDUCED DISORDER: ICD-10-CM

## 2024-12-18 DIAGNOSIS — I10 HYPERTENSION, UNSPECIFIED TYPE: ICD-10-CM

## 2024-12-18 PROCEDURE — 4004F PT TOBACCO SCREEN RCVD TLK: CPT | Performed by: INTERNAL MEDICINE

## 2024-12-18 PROCEDURE — 3078F DIAST BP <80 MM HG: CPT | Performed by: INTERNAL MEDICINE

## 2024-12-18 PROCEDURE — 99214 OFFICE O/P EST MOD 30 MIN: CPT | Performed by: INTERNAL MEDICINE

## 2024-12-18 PROCEDURE — 99396 PREV VISIT EST AGE 40-64: CPT | Performed by: INTERNAL MEDICINE

## 2024-12-18 PROCEDURE — 3074F SYST BP LT 130 MM HG: CPT | Performed by: INTERNAL MEDICINE

## 2024-12-18 RX ORDER — PREGABALIN 75 MG/1
CAPSULE ORAL
COMMUNITY
Start: 2024-12-17

## 2024-12-18 ASSESSMENT — ENCOUNTER SYMPTOMS
DIFFICULTY URINATING: 0
FEVER: 0
ABDOMINAL PAIN: 0
BLOOD IN STOOL: 0
DIZZINESS: 0
HEADACHES: 0
ARTHRALGIAS: 1
UNEXPECTED WEIGHT CHANGE: 0
FATIGUE: 0
COUGH: 0
DIARRHEA: 0
SINUS PAIN: 0
SORE THROAT: 0
PALPITATIONS: 0
BRUISES/BLEEDS EASILY: 0
WHEEZING: 0

## 2024-12-18 NOTE — PROGRESS NOTES
"Subjective   Patient ID: Lamberto Martinez is a 64 y.o. male who presents for Hyperlipidemia, Results (BW), and Foot Swelling (Right foot swelling, painful).     Annual preventive visit  - Vaccination reviewed and up-to-date  -- Screening for colon cancer obtained in 2019 follow-up gastroenterology in [facility]  -Nicotine dependency  Patient failed multiple treatment to quit smoking  \"I spent more 3 minutes counseling patient about need for smoking/tobacco cessation and how I can support efforts when patient is ready to quit.  Discussed nicotine replacement therapy, Varenicline, Bupropion, hypnosis, support groups, and accupunture as potential options.  Patient currently has no signs or symptoms of tobacco related disease.\".  - Patient wants to try holistic medicine hypnosis refer patient to [facility] follow-up results closely    -Needs a follow-up screening for lung cancer  I CT please need to schedule it follow-up results closely  Screening for depression negative     Follow-up  - Recent blood work reviewed  Elevated PSA previously seen by urology biopsy negative patient PSA trending down no signs of urinary symptoms continue monitor conservatively repeat PSA in 1 year  -Right foot pain all summer around the second toe etiology unclear obtain x-ray refer patient to podiatry  - Coronary calcifications, patient cardiac workup negative for reversible ischemia cardiac catheterization no significant disease.  Maximize medical management  - Mild carotid atherosclerosis continue with low-fat diet follow-up lipid profile controlled continue low-fat diet no signs of TIA  --Crestor side effect tendon rupture seen by cardiology patient switched to Crestor 5 mg daily controlled continue low-fat diet exercise  -- Nicotine dependency refer patient to nicotine cessation consultation and further management.  \"- Lung emphysema counseled for smoking cessation.  -- Underlying sleep apnea need to proceed with sleep testing " as recommended follow-up sleep clinic.  -History of lumbosacral surgery and chronic right lower extremity sciatica compensated on Lyrica and tizanidine follow-up pain management as recommended  - Psoriasis continue with current local creams steroid cream betamethasone.  Follow-up 1 year            Hyperlipidemia  Pertinent negatives include no chest pain.          Review of Systems   Constitutional:  Negative for fatigue, fever and unexpected weight change.   HENT:  Negative for congestion, ear discharge, ear pain, mouth sores, sinus pain and sore throat.    Eyes:  Negative for visual disturbance.   Respiratory:  Negative for cough and wheezing.    Cardiovascular:  Negative for chest pain, palpitations and leg swelling.   Gastrointestinal:  Negative for abdominal pain, blood in stool and diarrhea.   Genitourinary:  Negative for difficulty urinating.   Musculoskeletal:  Positive for arthralgias (Right foot pain).   Skin:  Negative for rash.   Neurological:  Negative for dizziness and headaches.   Hematological:  Does not bruise/bleed easily.   Psychiatric/Behavioral:  Negative for behavioral problems.    All other systems reviewed and are negative.      Objective   Lab Results   Component Value Date    HGBA1C 5.3 12/17/2024      /72   Pulse 77   Temp 36.1 °C (96.9 °F)   Wt 67.9 kg (149 lb 12.8 oz)   SpO2 96%   BMI 23.46 kg/m²   Lab Results   Component Value Date    WBC 9.0 12/17/2024    HGB 15.5 12/17/2024    HCT 47.3 12/17/2024     12/17/2024    CHOL 121 12/17/2024    TRIG 82 12/17/2024    HDL 37.6 12/17/2024    ALT 13 12/17/2024    AST 16 12/17/2024     12/17/2024    K 4.4 12/17/2024     12/17/2024    CREATININE 0.72 12/17/2024    BUN 20 12/17/2024    CO2 31 12/17/2024    TSH 1.27 12/17/2024    PSA 5.88 (H) 12/17/2024    INR 1.0 03/02/2022    HGBA1C 5.3 12/17/2024     par   Physical Exam  Vitals and nursing note reviewed.   Constitutional:       Appearance: Normal appearance.   HENT:  "     Head: Normocephalic.      Nose: Nose normal.   Eyes:      Conjunctiva/sclera: Conjunctivae normal.      Pupils: Pupils are equal, round, and reactive to light.   Cardiovascular:      Rate and Rhythm: Regular rhythm.   Pulmonary:      Effort: Pulmonary effort is normal.      Breath sounds: Normal breath sounds.   Abdominal:      General: Abdomen is flat.      Palpations: Abdomen is soft.   Musculoskeletal:         General: Tenderness (Tenderness on the bottom of the second right toe) present.      Cervical back: Neck supple.   Skin:     General: Skin is warm.   Neurological:      General: No focal deficit present.      Mental Status: He is oriented to person, place, and time.   Psychiatric:         Mood and Affect: Mood normal.         Assessment/Plan   Lamberto was seen today for hyperlipidemia, results and foot swelling.  Diagnoses and all orders for this visit:  Annual physical exam (Primary)  Right foot pain  -     XR foot right 1-2 views; Future  -     Referral to Podiatry; Future  Encounter for smoking cessation counseling  -     Referral to [facility]"; Future  Elevated PSA  Hypercholesteremia  Cigarette nicotine dependence with nicotine-induced disorder  Atherosclerosis of left carotid artery  Hypertension, unspecified type  Coronary artery disease due to lipid rich plaque  Lumbosacral radiculitis  Other orders  -     Follow Up In Primary Care - Established  -     Follow Up In Primary Care - Health Maintenance; Future    Annual preventive visit  - Vaccination reviewed and up-to-date  -- Screening for colon cancer obtained in 2019 follow-up gastroenterology in [facility]  -Nicotine dependency  Patient failed multiple treatment to quit smoking  \"I spent more 3 minutes counseling patient about need for smoking/tobacco cessation and how I can support efforts when patient is ready to quit.  Discussed nicotine replacement therapy, Varenicline, Bupropion, hypnosis, support groups, and accupunture " "as potential options.  Patient currently has no signs or symptoms of tobacco related disease.\".  - Patient wants to try holistic medicine hypnosis refer patient to [facility] follow-up results closely    -Needs a follow-up screening for lung cancer  I CT please need to schedule it follow-up results closely  Screening for depression negative     Follow-up  - Recent blood work reviewed  Elevated PSA previously seen by urology biopsy negative patient PSA trending down no signs of urinary symptoms continue monitor conservatively repeat PSA in 1 year  -Right foot pain all summer around the second toe etiology unclear obtain x-ray refer patient to podiatry  - Coronary calcifications, patient cardiac workup negative for reversible ischemia cardiac catheterization no significant disease.  Maximize medical management  - Mild carotid atherosclerosis continue with low-fat diet follow-up lipid profile controlled continue low-fat diet no signs of TIA  --Crestor side effect tendon rupture seen by cardiology patient switched to Crestor 5 mg daily controlled continue low-fat diet exercise  -- Nicotine dependency refer patient to nicotine cessation consultation and further management.  \"- Lung emphysema counseled for smoking cessation.  -- Underlying sleep apnea need to proceed with sleep testing as recommended follow-up sleep clinic.  -History of lumbosacral surgery and chronic right lower extremity sciatica compensated on Lyrica and tizanidine follow-up pain management as recommended  - Psoriasis continue with current local creams steroid cream betamethasone.  Follow-up 1 year          "

## 2025-03-25 ENCOUNTER — APPOINTMENT (OUTPATIENT)
Dept: PODIATRY | Facility: CLINIC | Age: 65
End: 2025-03-25
Payer: COMMERCIAL

## 2025-03-25 DIAGNOSIS — L90.9 PLANTAR FAT PAD ATROPHY: ICD-10-CM

## 2025-03-25 DIAGNOSIS — M77.42 METATARSALGIA OF BOTH FEET: Primary | ICD-10-CM

## 2025-03-25 DIAGNOSIS — M77.41 METATARSALGIA OF BOTH FEET: Primary | ICD-10-CM

## 2025-03-25 PROCEDURE — 1160F RVW MEDS BY RX/DR IN RCRD: CPT | Performed by: PODIATRIST

## 2025-03-25 PROCEDURE — 1159F MED LIST DOCD IN RCRD: CPT | Performed by: PODIATRIST

## 2025-03-25 PROCEDURE — 99202 OFFICE O/P NEW SF 15 MIN: CPT | Performed by: PODIATRIST

## 2025-03-25 NOTE — PROGRESS NOTES
History of Present Illness:   Patient states they are here for foot pain  Most bothersome when walking  Denies trauma to area  Hurts throughout day  Denies being DM  No improvement noted  Looking for relief  NO other pedal complaints at this time      Past Medical History  Past Medical History:   Diagnosis Date    Encounter for other preprocedural examination 03/02/2022    Preoperative clearance    Pain in unspecified shoulder 03/19/2015    Shoulder pain    Personal history of diseases of the skin and subcutaneous tissue 11/25/2013    History of psoriasis    Primary osteoarthritis, unspecified ankle and foot 03/06/2014    Arthritis of foot, degenerative       Medications and Allergies have been reviewed.    Review Of Systems:  GENERAL: No weight loss, malaise or fevers.  HEENT: Negative for frequent or significant headaches,   RESPIRATORY: Negative for cough, wheezing or shortness of breath.  CARDIOVASCULAR: Negative for chest pain, leg swelling or palpitations.    Examination of Both Lower Extremities:   Objective:   Vasc: DP and PT pulses are palpable bilateral.  CFT is less than 3 seconds bilateral.  Skin temperature is warm to cool proximal to distal bilateral.      Neuro: Vibratory, light touch and proprioception are intact bilateral.        Derm: Nails 1-5 bilateral are intact.  Skin is supple with normal texture and turgor noted.  Webspaces are clean, dry and intact bilateral.  There are no hyperkeratoses, ulcerations, verruca or other lesions noted.      Ortho: Muscle strength is 5/5 for all pedal groups tested.   Pain to plantar met head with palpation. NO edema, erythema or ecchymosis noted. Decreased fat pad atrophy noted  1. Metatarsalgia of both feet        2. Plantar fat pad atrophy            Patient exam and eval  Discussed arthritic and biomechanic changes in feet  Discussed causes, symptoms, aggravating factors and treatment options  Discussed radiographs - - order placed in December  Recommend  stiff supportive shoe gear  Shoes that do not twist or bend  Minimize walking barefoot  Discussed 80/20 rule  Discussed ice, nsaids, martina vivar/biofreeze  Patient to follow up if no improvement noted.   Pt in agreement to plan  All questions answered      Ifrah Kinney DPM  [phone]  Option 2  Fax: [phone]

## 2025-05-27 ENCOUNTER — APPOINTMENT (OUTPATIENT)
Dept: PODIATRY | Facility: CLINIC | Age: 65
End: 2025-05-27
Payer: COMMERCIAL

## 2025-06-23 ENCOUNTER — APPOINTMENT (OUTPATIENT)
Dept: PRIMARY CARE | Facility: CLINIC | Age: 65
End: 2025-06-23
Payer: COMMERCIAL

## 2025-07-31 ENCOUNTER — APPOINTMENT (OUTPATIENT)
Dept: PRIMARY CARE | Facility: CLINIC | Age: 65
End: 2025-07-31
Payer: COMMERCIAL

## 2025-07-31 VITALS
BODY MASS INDEX: 24.89 KG/M2 | HEIGHT: 65 IN | DIASTOLIC BLOOD PRESSURE: 80 MMHG | SYSTOLIC BLOOD PRESSURE: 110 MMHG | TEMPERATURE: 97.4 F | HEART RATE: 78 BPM | WEIGHT: 149.4 LBS | OXYGEN SATURATION: 97 %

## 2025-07-31 DIAGNOSIS — R73.09 ABNORMAL BLOOD SUGAR: ICD-10-CM

## 2025-07-31 DIAGNOSIS — R53.83 OTHER FATIGUE: ICD-10-CM

## 2025-07-31 DIAGNOSIS — M54.16 RIGHT LUMBAR RADICULITIS: ICD-10-CM

## 2025-07-31 DIAGNOSIS — Z79.899 HIGH RISK MEDICATION USE: ICD-10-CM

## 2025-07-31 DIAGNOSIS — E78.00 HYPERCHOLESTEREMIA: ICD-10-CM

## 2025-07-31 DIAGNOSIS — Z12.5 SCREENING FOR PROSTATE CANCER: ICD-10-CM

## 2025-07-31 DIAGNOSIS — I10 HYPERTENSION, UNSPECIFIED TYPE: ICD-10-CM

## 2025-07-31 DIAGNOSIS — E53.8 VITAMIN B12 DEFICIENCY: ICD-10-CM

## 2025-07-31 DIAGNOSIS — F17.219 CIGARETTE NICOTINE DEPENDENCE WITH NICOTINE-INDUCED DISORDER: ICD-10-CM

## 2025-07-31 DIAGNOSIS — E55.9 VITAMIN D DEFICIENCY, UNSPECIFIED: ICD-10-CM

## 2025-07-31 DIAGNOSIS — Z00.00 ANNUAL PHYSICAL EXAM: Primary | ICD-10-CM

## 2025-07-31 DIAGNOSIS — Z12.2 ENCOUNTER FOR SCREENING FOR LUNG CANCER: ICD-10-CM

## 2025-07-31 PROCEDURE — 99397 PER PM REEVAL EST PAT 65+ YR: CPT | Performed by: INTERNAL MEDICINE

## 2025-07-31 PROCEDURE — 99406 BEHAV CHNG SMOKING 3-10 MIN: CPT | Performed by: INTERNAL MEDICINE

## 2025-07-31 PROCEDURE — 3079F DIAST BP 80-89 MM HG: CPT | Performed by: INTERNAL MEDICINE

## 2025-07-31 PROCEDURE — 1159F MED LIST DOCD IN RCRD: CPT | Performed by: INTERNAL MEDICINE

## 2025-07-31 PROCEDURE — 1170F FXNL STATUS ASSESSED: CPT | Performed by: INTERNAL MEDICINE

## 2025-07-31 PROCEDURE — 3074F SYST BP LT 130 MM HG: CPT | Performed by: INTERNAL MEDICINE

## 2025-07-31 PROCEDURE — 3008F BODY MASS INDEX DOCD: CPT | Performed by: INTERNAL MEDICINE

## 2025-07-31 PROCEDURE — 99214 OFFICE O/P EST MOD 30 MIN: CPT | Performed by: INTERNAL MEDICINE

## 2025-07-31 PROCEDURE — 1160F RVW MEDS BY RX/DR IN RCRD: CPT | Performed by: INTERNAL MEDICINE

## 2025-07-31 RX ORDER — PREGABALIN 75 MG/1
75 CAPSULE ORAL DAILY
Qty: 90 CAPSULE | Refills: 0 | Status: SHIPPED | OUTPATIENT
Start: 2025-10-22 | End: 2026-01-20

## 2025-07-31 ASSESSMENT — ANXIETY QUESTIONNAIRES
6. BECOMING EASILY ANNOYED OR IRRITABLE: NOT AT ALL
3. WORRYING TOO MUCH ABOUT DIFFERENT THINGS: NOT AT ALL
2. NOT BEING ABLE TO STOP OR CONTROL WORRYING: NOT AT ALL
4. TROUBLE RELAXING: NOT AT ALL
1. FEELING NERVOUS, ANXIOUS, OR ON EDGE: NOT AT ALL
5. BEING SO RESTLESS THAT IT IS HARD TO SIT STILL: NOT AT ALL
GAD7 TOTAL SCORE: 0
7. FEELING AFRAID AS IF SOMETHING AWFUL MIGHT HAPPEN: NOT AT ALL

## 2025-07-31 ASSESSMENT — ENCOUNTER SYMPTOMS
SORE THROAT: 0
FATIGUE: 0
ABDOMINAL PAIN: 0
PALPITATIONS: 0
WHEEZING: 0
BRUISES/BLEEDS EASILY: 0
BLOOD IN STOOL: 0
SINUS PAIN: 0
UNEXPECTED WEIGHT CHANGE: 0
DIARRHEA: 0
DIZZINESS: 0
COUGH: 0
HEADACHES: 0
ARTHRALGIAS: 0
BACK PAIN: 1
DIFFICULTY URINATING: 0
FEVER: 0

## 2025-07-31 ASSESSMENT — ACTIVITIES OF DAILY LIVING (ADL)
FEEDING YOURSELF: INDEPENDENT
PATIENT'S MEMORY ADEQUATE TO SAFELY COMPLETE DAILY ACTIVITIES?: YES
HEARING - LEFT EAR: FUNCTIONAL
JUDGMENT_ADEQUATE_SAFELY_COMPLETE_DAILY_ACTIVITIES: YES
DRESSING YOURSELF: INDEPENDENT
GROOMING: INDEPENDENT
HEARING - RIGHT EAR: FUNCTIONAL
ADEQUATE_TO_COMPLETE_ADL: YES
TOILETING: INDEPENDENT
BATHING: INDEPENDENT
WALKS IN HOME: INDEPENDENT

## 2025-07-31 ASSESSMENT — PATIENT HEALTH QUESTIONNAIRE - PHQ9: 2. FEELING DOWN, DEPRESSED OR HOPELESS: NOT AT ALL

## 2025-07-31 NOTE — PROGRESS NOTES
"Subjective   Patient ID: Lamberto Martinez is a 65 y.o. male who presents for Annual Exam, Medication Problem (Discuss Lyrica, Rosuvastatin and Tizanidine, patient would like Dr. Humphries to take over prescribing. ), and spots (Spots on arms, itch at times ).    Annual preventive visit  - Vaccination reviewed and up-to-date  -- Screening for colon cancer obtained in 2019 follow-up gastroenterology in [facility]  -Nicotine dependency  Patient failed multiple treatment to quit smoking  \"I spent more 3 minutes counseling patient about need for smoking/tobacco cessation and how I can support efforts when patient is ready to quit.  Discussed nicotine replacement therapy, Varenicline, Bupropion, hypnosis, support groups, and accupunture as potential options.  Patient currently has no signs or symptoms of tobacco related disease.\".  - Patient wants to try holistic medicine hypnosis refer patient to [facility] follow-up results closely     -Needs a follow-up screening for lung cancer  I discussed smoking history, patient meets criteria for lung cancer screening with low-dose CT scan. By using shared decision making we determined that patient would benefit from that screening including a discussion of benefits and harms of screening, follow-up diagnostic testing, over diagnosis, false positive  rate and total radiation exposure  I counseled the patient about the  importance of adhering to annual lung cancer low-dose CT screening, the impact of comorbidities and his  ability or willingness to undergo diagnosis and treatment if abnormality discovered. I provided patient an order for low-dose CT lung cancer screening.  I CT please need to schedule it follow-up results closely  -Screening for depression negative     Follow-up  - Chronic lumbosacral disease of lumbar radiculitis postlaminectomy syndrome patient maintained on Lyrica 75 mg daily from previous orthopedic surgery symptoms are controlled patient tried to wean " "off without success  Continue Lyrica once a day from our office will follow-up closely as needed  Urine drug screen today  New pain management controlled obtained today  - Elevated PSA previously seen by urology biopsy negative patient PSA trending down no signs of urinary symptoms continue monitor conservatively repeat PSA in 1 year  - Coronary calcifications, patient cardiac workup negative for reversible ischemia cardiac catheterization no significant disease.  Maximize medical management  - Mild carotid atherosclerosis continue with low-fat diet follow-up lipid profile controlled continue low-fat diet no signs of TIA  --Crestor side effect tendon rupture seen by cardiology patient switched to Crestor 5 mg daily controlled continue low-fat diet exercise  -- Nicotine dependency refer patient to nicotine cessation consultation and further management.  \"- Lung emphysema counseled for smoking cessation.  -- Underlying sleep apnea need to proceed with sleep testing as recommended follow-up sleep clinic.  -History of lumbosacral surgery and chronic right lower extremity sciatica compensated on Lyrica and tizanidine follow-up pain management as recommended  - Psoriasis continue with current local creams steroid cream betamethasone.  Follow-up 5 months                Review of Systems   Constitutional:  Negative for fatigue, fever and unexpected weight change.   HENT:  Negative for congestion, ear discharge, ear pain, mouth sores, sinus pain and sore throat.    Eyes:  Negative for visual disturbance.   Respiratory:  Negative for cough and wheezing.    Cardiovascular:  Negative for chest pain, palpitations and leg swelling.   Gastrointestinal:  Negative for abdominal pain, blood in stool and diarrhea.   Genitourinary:  Negative for difficulty urinating.   Musculoskeletal:  Positive for back pain. Negative for arthralgias.   Skin:  Negative for rash.   Neurological:  Negative for dizziness and headaches.   Hematological:  " "Does not bruise/bleed easily.   Psychiatric/Behavioral:  Negative for behavioral problems.    All other systems reviewed and are negative.      Objective   Lab Results   Component Value Date    HGBA1C 5.3 12/17/2024      /80   Pulse 78   Temp 36.3 °C (97.4 °F)   Ht 1.651 m (5' 5\")   Wt 67.8 kg (149 lb 6.4 oz)   SpO2 97%   BMI 24.86 kg/m²   Lab Results   Component Value Date    WBC 9.0 12/17/2024    HGB 15.5 12/17/2024    HCT 47.3 12/17/2024     12/17/2024    CHOL 121 12/17/2024    TRIG 82 12/17/2024    HDL 37.6 12/17/2024    ALT 13 12/17/2024    AST 16 12/17/2024     12/17/2024    K 4.4 12/17/2024     12/17/2024    CREATININE 0.72 12/17/2024    BUN 20 12/17/2024    CO2 31 12/17/2024    TSH 1.27 12/17/2024    PSA 5.88 (H) 12/17/2024    INR 1.0 03/02/2022    HGBA1C 5.3 12/17/2024     par   Physical Exam  Vitals and nursing note reviewed.   Constitutional:       Appearance: Normal appearance.   HENT:      Head: Normocephalic.      Nose: Nose normal.     Eyes:      Conjunctiva/sclera: Conjunctivae normal.      Pupils: Pupils are equal, round, and reactive to light.       Cardiovascular:      Rate and Rhythm: Regular rhythm.   Pulmonary:      Effort: Pulmonary effort is normal.      Breath sounds: Normal breath sounds.   Abdominal:      General: Abdomen is flat.      Palpations: Abdomen is soft.     Musculoskeletal:      Cervical back: Neck supple.     Skin:     General: Skin is warm.     Neurological:      General: No focal deficit present.      Mental Status: He is oriented to person, place, and time.      Sensory: Sensory deficit (Chronic lumbosacral disease and lumbar radiculitis) present.     Psychiatric:         Mood and Affect: Mood normal.       OARRS:  Rosemary Humphries MD on 7/31/2025  9:18 AM  I have personally reviewed the OARRS report for Lamberto NORI Martinez. I have considered the risks of abuse, dependence, addiction and diversion    Is the patient prescribed a combination of a " benzodiazepine and opioid?  Yes, I feel it is clincially indicated to continue the medication and have discussed with the patient risks/benefits/alternatives.    Last Urine Drug Screen / ordered today: Yes  No results found for this or any previous visit (from the past 8760 hours).  N/A    Clinical rationale for not completing a Urine Drug Screen: pend      Controlled Substance Agreement:  Date of the Last Agreement: y 7/31/2025  Reviewed Controlled Substance Agreement including but not limited to the benefits, risks, and alternatives to treatment with a Controlled Substance medication(s).    Lyrica:  What is the patient's goal of therapy? y  Is this being achieved with current treatment? y    Pain Assessment:  No data recorded    Activities of Daily Living:  Is your overall impression that this patient is benefiting (symptom reduction outweighs side effects) from Lyrica therapy? Yes     1. Physical Functioning: Better  2. Family Relationship: Same  3. Social Relationship: Better  4. Mood: Same  5. Sleep Patterns: Better  6. Overall Function: Better    Assessment/Plan   Lamberto was seen today for annual exam, medication problem and spots.  Diagnoses and all orders for this visit:  Annual physical exam (Primary)  -     CBC and Auto Differential; Future  -     Comprehensive Metabolic Panel; Future  -     Hemoglobin A1C; Future  -     Lipid Panel; Future  -     TSH with reflex to Free T4 if abnormal; Future  -     Vitamin B12; Future  -     Vitamin D 25-Hydroxy,Total (for eval of Vitamin D levels); Future  -     Prostate Specific Antigen, Screen; Future  -     CT lung screening low dose; Future  -     CBC and Auto Differential  -     Comprehensive Metabolic Panel  -     Hemoglobin A1C  -     Lipid Panel  -     TSH with reflex to Free T4 if abnormal  -     Vitamin B12  -     Vitamin D 25-Hydroxy,Total (for eval of Vitamin D levels)  -     Prostate Specific Antigen, Screen  High risk medication use  -     CBC and Auto  "Differential; Future  -     CBC and Auto Differential  Hypertension, unspecified type  -     Comprehensive Metabolic Panel; Future  -     Comprehensive Metabolic Panel  Abnormal blood sugar  -     Hemoglobin A1C; Future  -     Hemoglobin A1C  Hypercholesteremia  -     Lipid Panel; Future  -     Lipid Panel  Other fatigue  -     TSH with reflex to Free T4 if abnormal; Future  -     TSH with reflex to Free T4 if abnormal  Vitamin B12 deficiency  -     Vitamin B12; Future  -     Vitamin B12  Vitamin D deficiency, unspecified  -     Vitamin D 25-Hydroxy,Total (for eval of Vitamin D levels); Future  -     Vitamin D 25-Hydroxy,Total (for eval of Vitamin D levels)  Screening for prostate cancer  -     Prostate Specific Antigen, Screen; Future  -     Prostate Specific Antigen, Screen  Encounter for screening for lung cancer  -     CT lung screening low dose; Future  Cigarette nicotine dependence with nicotine-induced disorder  Right lumbar radiculitis  Other orders  -     Follow Up In Primary Care - Health Maintenance  -     Follow Up In Primary Care - Established; Future   Annual preventive visit  - Vaccination reviewed and up-to-date  -- Screening for colon cancer obtained in 2019 follow-up gastroenterology in [facility]  -Nicotine dependency  Patient failed multiple treatment to quit smoking  \"I spent more 3 minutes counseling patient about need for smoking/tobacco cessation and how I can support efforts when patient is ready to quit.  Discussed nicotine replacement therapy, Varenicline, Bupropion, hypnosis, support groups, and accupunture as potential options.  Patient currently has no signs or symptoms of tobacco related disease.\".  - Patient wants to try holistic medicine hypnosis refer patient to [facility] follow-up results closely     -Needs a follow-up screening for lung cancer  I discussed smoking history, patient meets criteria for lung cancer screening with low-dose CT scan. By using shared decision " "making we determined that patient would benefit from that screening including a discussion of benefits and harms of screening, follow-up diagnostic testing, over diagnosis, false positive  rate and total radiation exposure  I counseled the patient about the  importance of adhering to annual lung cancer low-dose CT screening, the impact of comorbidities and his  ability or willingness to undergo diagnosis and treatment if abnormality discovered. I provided patient an order for low-dose CT lung cancer screening.  I CT please need to schedule it follow-up results closely  -Screening for depression negative     Follow-up  - Chronic lumbosacral disease of lumbar radiculitis postlaminectomy syndrome patient maintained on Lyrica 75 mg daily from previous orthopedic surgery symptoms are controlled patient tried to wean off without success  Continue Lyrica once a day from our office will follow-up closely as needed  Urine drug screen today  New pain management controlled obtained today  - Elevated PSA previously seen by urology biopsy negative patient PSA trending down no signs of urinary symptoms continue monitor conservatively repeat PSA in 1 year  - Coronary calcifications, patient cardiac workup negative for reversible ischemia cardiac catheterization no significant disease.  Maximize medical management  - Mild carotid atherosclerosis continue with low-fat diet follow-up lipid profile controlled continue low-fat diet no signs of TIA  --Crestor side effect tendon rupture seen by cardiology patient switched to Crestor 5 mg daily controlled continue low-fat diet exercise  -- Nicotine dependency refer patient to nicotine cessation consultation and further management.  \"- Lung emphysema counseled for smoking cessation.  -- Underlying sleep apnea need to proceed with sleep testing as recommended follow-up sleep clinic.  -History of lumbosacral surgery and chronic right lower extremity sciatica compensated on Lyrica and " tizanidine follow-up pain management as recommended  - Psoriasis continue with current local creams steroid cream betamethasone.  Follow-up 5 months

## 2025-08-01 LAB
AMPHETAMINES UR QL: NEGATIVE NG/ML
BARBITURATES UR QL: NEGATIVE NG/ML
BENZODIAZ UR QL: NEGATIVE NG/ML
BZE UR QL: NEGATIVE NG/ML
CREAT UR-MCNC: 105 MG/DL
FENTANYL UR QL SCN: NEGATIVE NG/ML
METHADONE UR QL: NEGATIVE NG/ML
OPIATES UR QL: NEGATIVE NG/ML
OXIDANTS UR QL: NEGATIVE MCG/ML
OXYCODONE UR QL: NEGATIVE NG/ML
PCP UR QL: NEGATIVE NG/ML
PH UR: 5.3 [PH] (ref 4.5–9)
QUEST NOTES AND COMMENTS: NORMAL
THC UR QL: NEGATIVE NG/ML

## 2025-08-21 LAB
25(OH)D3+25(OH)D2 SERPL-MCNC: 58 NG/ML (ref 30–100)
ALBUMIN SERPL-MCNC: 4.6 G/DL (ref 3.6–5.1)
ALP SERPL-CCNC: 69 U/L (ref 35–144)
ALT SERPL-CCNC: 15 U/L (ref 9–46)
ANION GAP SERPL CALCULATED.4IONS-SCNC: 6 MMOL/L (CALC) (ref 7–17)
AST SERPL-CCNC: 18 U/L (ref 10–35)
BASOPHILS # BLD AUTO: 101 CELLS/UL (ref 0–200)
BASOPHILS NFR BLD AUTO: 1.1 %
BILIRUB SERPL-MCNC: 0.5 MG/DL (ref 0.2–1.2)
BUN SERPL-MCNC: 19 MG/DL (ref 7–25)
CALCIUM SERPL-MCNC: 9.3 MG/DL (ref 8.6–10.3)
CHLORIDE SERPL-SCNC: 105 MMOL/L (ref 98–110)
CHOLEST SERPL-MCNC: 124 MG/DL
CHOLEST/HDLC SERPL: 3.1 (CALC)
CO2 SERPL-SCNC: 31 MMOL/L (ref 20–32)
CREAT SERPL-MCNC: 0.72 MG/DL (ref 0.7–1.35)
EGFRCR SERPLBLD CKD-EPI 2021: 101 ML/MIN/1.73M2
EOSINOPHIL # BLD AUTO: 258 CELLS/UL (ref 15–500)
EOSINOPHIL NFR BLD AUTO: 2.8 %
ERYTHROCYTE [DISTWIDTH] IN BLOOD BY AUTOMATED COUNT: 12.4 % (ref 11–15)
EST. AVERAGE GLUCOSE BLD GHB EST-MCNC: 114 MG/DL
EST. AVERAGE GLUCOSE BLD GHB EST-SCNC: 6.3 MMOL/L
GLUCOSE SERPL-MCNC: 87 MG/DL (ref 65–99)
HBA1C MFR BLD: 5.6 %
HCT VFR BLD AUTO: 46.7 % (ref 38.5–50)
HDLC SERPL-MCNC: 40 MG/DL
HGB BLD-MCNC: 15.6 G/DL (ref 13.2–17.1)
LDLC SERPL CALC-MCNC: 65 MG/DL (CALC)
LYMPHOCYTES # BLD AUTO: 2042 CELLS/UL (ref 850–3900)
LYMPHOCYTES NFR BLD AUTO: 22.2 %
MCH RBC QN AUTO: 31.5 PG (ref 27–33)
MCHC RBC AUTO-ENTMCNC: 33.4 G/DL (ref 32–36)
MCV RBC AUTO: 94.2 FL (ref 80–100)
MONOCYTES # BLD AUTO: 911 CELLS/UL (ref 200–950)
MONOCYTES NFR BLD AUTO: 9.9 %
NEUTROPHILS # BLD AUTO: 5888 CELLS/UL (ref 1500–7800)
NEUTROPHILS NFR BLD AUTO: 64 %
NONHDLC SERPL-MCNC: 84 MG/DL (CALC)
PLATELET # BLD AUTO: 317 THOUSAND/UL (ref 140–400)
PMV BLD REES-ECKER: 9.7 FL (ref 7.5–12.5)
POTASSIUM SERPL-SCNC: 4.4 MMOL/L (ref 3.5–5.3)
PROT SERPL-MCNC: 6.5 G/DL (ref 6.1–8.1)
PSA SERPL-MCNC: 7.92 NG/ML
RBC # BLD AUTO: 4.96 MILLION/UL (ref 4.2–5.8)
SODIUM SERPL-SCNC: 142 MMOL/L (ref 135–146)
TRIGL SERPL-MCNC: 105 MG/DL
TSH SERPL-ACNC: 1.14 MIU/L (ref 0.4–4.5)
VIT B12 SERPL-MCNC: 383 PG/ML (ref 200–1100)
WBC # BLD AUTO: 9.2 THOUSAND/UL (ref 3.8–10.8)

## 2025-12-09 ENCOUNTER — APPOINTMENT (OUTPATIENT)
Dept: PRIMARY CARE | Facility: CLINIC | Age: 65
End: 2025-12-09
Payer: COMMERCIAL